# Patient Record
Sex: FEMALE | Race: WHITE | URBAN - NONMETROPOLITAN AREA
[De-identification: names, ages, dates, MRNs, and addresses within clinical notes are randomized per-mention and may not be internally consistent; named-entity substitution may affect disease eponyms.]

---

## 2020-12-03 ENCOUNTER — OFFICE VISIT (OUTPATIENT)
Dept: PRIMARY CARE CLINIC | Age: 60
End: 2020-12-03
Payer: COMMERCIAL

## 2020-12-03 VITALS
HEART RATE: 76 BPM | HEIGHT: 64 IN | TEMPERATURE: 97.6 F | WEIGHT: 194 LBS | BODY MASS INDEX: 33.12 KG/M2 | RESPIRATION RATE: 14 BRPM | DIASTOLIC BLOOD PRESSURE: 70 MMHG | SYSTOLIC BLOOD PRESSURE: 128 MMHG | OXYGEN SATURATION: 97 %

## 2020-12-03 LAB
ALBUMIN SERPL-MCNC: 4.5 G/DL (ref 3.5–5.2)
ALP BLD-CCNC: 141 U/L (ref 35–104)
ALT SERPL-CCNC: 13 U/L (ref 5–33)
ANION GAP SERPL CALCULATED.3IONS-SCNC: 11 MMOL/L (ref 7–19)
AST SERPL-CCNC: 18 U/L (ref 5–32)
BASOPHILS ABSOLUTE: 0.1 K/UL (ref 0–0.2)
BASOPHILS RELATIVE PERCENT: 1 % (ref 0–1)
BILIRUB SERPL-MCNC: 0.3 MG/DL (ref 0.2–1.2)
BUN BLDV-MCNC: 11 MG/DL (ref 6–20)
CALCIUM SERPL-MCNC: 9.7 MG/DL (ref 8.6–10)
CHLORIDE BLD-SCNC: 101 MMOL/L (ref 98–111)
CO2: 28 MMOL/L (ref 22–29)
CREAT SERPL-MCNC: 0.6 MG/DL (ref 0.5–0.9)
EOSINOPHILS ABSOLUTE: 0.2 K/UL (ref 0–0.6)
EOSINOPHILS RELATIVE PERCENT: 2.2 % (ref 0–5)
GFR AFRICAN AMERICAN: >59
GFR NON-AFRICAN AMERICAN: >60
GLUCOSE BLD-MCNC: 91 MG/DL (ref 74–109)
HCT VFR BLD CALC: 43.5 % (ref 37–47)
HEMOGLOBIN: 13.6 G/DL (ref 12–16)
IMMATURE GRANULOCYTES #: 0 K/UL
LYMPHOCYTES ABSOLUTE: 2 K/UL (ref 1.1–4.5)
LYMPHOCYTES RELATIVE PERCENT: 28.7 % (ref 20–40)
MCH RBC QN AUTO: 27.6 PG (ref 27–31)
MCHC RBC AUTO-ENTMCNC: 31.3 G/DL (ref 33–37)
MCV RBC AUTO: 88.2 FL (ref 81–99)
MONOCYTES ABSOLUTE: 0.5 K/UL (ref 0–0.9)
MONOCYTES RELATIVE PERCENT: 6.5 % (ref 0–10)
NEUTROPHILS ABSOLUTE: 4.3 K/UL (ref 1.5–7.5)
NEUTROPHILS RELATIVE PERCENT: 61.3 % (ref 50–65)
PDW BLD-RTO: 13.2 % (ref 11.5–14.5)
PLATELET # BLD: 264 K/UL (ref 130–400)
PMV BLD AUTO: 9.6 FL (ref 9.4–12.3)
POTASSIUM SERPL-SCNC: 4 MMOL/L (ref 3.5–5)
RBC # BLD: 4.93 M/UL (ref 4.2–5.4)
SODIUM BLD-SCNC: 140 MMOL/L (ref 136–145)
TOTAL PROTEIN: 7.4 G/DL (ref 6.6–8.7)
WBC # BLD: 6.9 K/UL (ref 4.8–10.8)

## 2020-12-03 PROCEDURE — 99203 OFFICE O/P NEW LOW 30 MIN: CPT | Performed by: NURSE PRACTITIONER

## 2020-12-03 RX ORDER — HYDROCHLOROTHIAZIDE 12.5 MG/1
12.5 CAPSULE, GELATIN COATED ORAL DAILY
COMMUNITY

## 2020-12-03 RX ORDER — ANTIARTHRITIC COMBINATION NO.2 900 MG
3 TABLET ORAL DAILY
COMMUNITY

## 2020-12-03 RX ORDER — ANASTROZOLE 1 MG/1
1 TABLET ORAL DAILY
COMMUNITY

## 2020-12-03 RX ORDER — M-VIT,TX,IRON,MINS/CALC/FOLIC 27MG-0.4MG
1 TABLET ORAL DAILY
COMMUNITY

## 2020-12-03 RX ORDER — CITALOPRAM 20 MG/1
20 TABLET ORAL DAILY
COMMUNITY

## 2020-12-03 RX ORDER — CYCLOBENZAPRINE HCL 5 MG
TABLET ORAL
COMMUNITY

## 2020-12-03 RX ORDER — POTASSIUM CHLORIDE 20 MEQ/1
20 TABLET, EXTENDED RELEASE ORAL DAILY
COMMUNITY

## 2020-12-03 RX ORDER — PSEUDOEPHEDRINE HYDROCHLORIDE 30 MG/1
30 TABLET ORAL EVERY 6 HOURS PRN
Qty: 30 TABLET | Refills: 1 | Status: SHIPPED | OUTPATIENT
Start: 2020-12-03 | End: 2020-12-17

## 2020-12-03 RX ORDER — ECHINACEA PURPUREA EXTRACT 125 MG
1 TABLET ORAL PRN
COMMUNITY

## 2020-12-03 RX ORDER — AMILORIDE HCL 5 MG
10 TABLET ORAL EVERY 4 HOURS PRN
COMMUNITY

## 2020-12-03 RX ORDER — FLUTICASONE PROPIONATE 50 MCG
2 SPRAY, SUSPENSION (ML) NASAL DAILY
Qty: 1 BOTTLE | Refills: 1 | Status: SHIPPED | OUTPATIENT
Start: 2020-12-03 | End: 2021-01-02

## 2020-12-03 RX ORDER — AMLODIPINE BESYLATE 5 MG/1
5 TABLET ORAL DAILY
COMMUNITY

## 2020-12-03 SDOH — HEALTH STABILITY: MENTAL HEALTH: HOW OFTEN DO YOU HAVE A DRINK CONTAINING ALCOHOL?: NEVER

## 2020-12-03 ASSESSMENT — ENCOUNTER SYMPTOMS
EYE PAIN: 0
SINUS PRESSURE: 1
RHINORRHEA: 0
DIARRHEA: 0
VOMITING: 0
EYE REDNESS: 0
SORE THROAT: 0
COUGH: 0

## 2020-12-03 NOTE — PROGRESS NOTES
[address] [place] [address] [facility] [address] [address]  Dept: [phone]  Dept Fax: [phone]  Loc: [phone]    Pedro Thomason is a 62 y.o. female who presents today for her medical conditions/complaintsas noted below. Pedro Thomason is c/o of Otalgia (both ears are popping, started x1 week. )      HPI:   Patient notes bilateral popping of ears. Notes similar many times in the past and personal history of sinus /allergy issues. She notes it used to be controlled with sudafed, however, notes since being diagnosed with dry eye she has not taken this medication very often. She has not taken it this complaint, notes ears bothering her for around one week. Patient also notes she has recently moved to the area full time and is in the process of establshing PCP with Dr. Rigo Bertrand. Otalgia    There is pain in both ears. This is a recurrent problem. The current episode started in the past 7 days. The problem occurs constantly. The problem has been gradually worsening. There has been no fever. The patient is experiencing no pain. Pertinent negatives include no coughing, diarrhea, ear discharge, rhinorrhea, sore throat or vomiting. She has tried nothing for the symptoms. Her past medical history is significant for a chronic ear infection and hearing loss. There is no history of a tympanostomy tube. notes this history around 26years old       Past Medical History:   Diagnosis Date    Cancer [facility]) 2019    breast cancer, stage one on left side     Hypertension      History reviewed. No pertinent surgical history.   Family History   Problem Relation Age of Onset    Diabetes Mother     High Blood Pressure Mother     High Cholesterol Mother     Stroke Mother     Cancer Father     Diabetes Brother     High Blood Pressure Brother     High Cholesterol Brother      Social History     Tobacco Use    Smoking status: Never Smoker    Smokeless tobacco: Never Used   Substance Use Topics    Alcohol use: Never     Frequency: Never      Current Outpatient Medications on File Prior to Visit   Medication Sig Dispense Refill    amLODIPine (NORVASC) 5 MG tablet Take 5 mg by mouth daily      hydroCHLOROthiazide (MICROZIDE) 12.5 MG capsule Take 12.5 mg by mouth daily      potassium chloride (KLOR-CON M) 20 MEQ extended release tablet Take 20 mEq by mouth daily      anastrozole (ARIMIDEX) 1 MG tablet Take 1 mg by mouth daily      citalopram (CELEXA) 20 MG tablet Take 20 mg by mouth daily      Multiple Vitamins-Minerals (THERAPEUTIC MULTIVITAMIN-MINERALS) tablet Take 1 tablet by mouth daily      Phenylephrine HCl 10 MG TABS Take 10 mg by mouth every 4 hours as needed      psyllium (METAMUCIL SMOOTH TEXTURE) 28 % packet Take by mouth 2 times daily      cyclobenzaprine (FLEXERIL) 5 MG tablet cyclobenzaprine 5 mg tablet   Take 1-2 tab PO BID AS NEEDED      Biotin 5000 MCG TABS Take 3 capsules by mouth daily      sodium chloride (ALTAMIST SPRAY) 0.65 % nasal spray 1 spray by Nasal route as needed       No current facility-administered medications on file prior to visit.        Allergies   Allergen Reactions    Ace Inhibitors Swelling and Other (See Comments)     lisinopril      Omeprazole Other (See Comments)    Lisinopril Swelling    Sulfa Antibiotics Swelling     Health Maintenance   Topic Date Due    Potassium monitoring  1960    Creatinine monitoring  1960    Hepatitis C screen  1960    HIV screen  01/12/1976    DTaP/Tdap/Td vaccine (1 - Tdap) 01/12/1980    Cervical cancer screen  01/12/1982    Lipid screen  01/12/2001    Diabetes screen  01/12/2001    Breast cancer screen  01/12/2011    Shingles Vaccine (1 of 2) 01/12/2011    Colon cancer screen colonoscopy  01/12/2011    Flu vaccine (1) 09/01/2020    Hepatitis A vaccine  Aged Out    Hepatitis B vaccine  Aged Out    Hib vaccine  Aged Out    Meningococcal (ACWY) vaccine  Aged Out    Pneumococcal 0-64 years Vaccine  Aged Out       Subjective:   Review of Systems   HENT: Positive for ear pain and sinus pressure. Negative for ear discharge, rhinorrhea and sore throat. Eyes: Negative for pain and redness. Respiratory: Negative for cough. Gastrointestinal: Negative for diarrhea and vomiting. Objective:   /70 (Site: Right Upper Arm, Position: Sitting)   Pulse 76   Temp 97.6 °F (36.4 °C) (Temporal)   Resp 14   Ht 5' 4\" (1.626 m)   Wt 194 lb (88 kg)   SpO2 97%   BMI 33.30 kg/m²    Physical Exam  Vitals signs and nursing note reviewed. Constitutional:       General: She is not in acute distress. Appearance: Normal appearance. She is not ill-appearing. HENT:      Head: Normocephalic. Right Ear: External ear normal. A middle ear effusion is present. There is no impacted cerumen. Tympanic membrane is not injected or erythematous. Left Ear: External ear normal. A middle ear effusion is present. There is no impacted cerumen. Tympanic membrane is erythematous. Tympanic membrane is not injected. Nose: No congestion. Mouth/Throat:      Mouth: Mucous membranes are moist.      Pharynx: Oropharynx is clear. No oropharyngeal exudate or posterior oropharyngeal erythema. Eyes:      Pupils: Pupils are equal, round, and reactive to light. Cardiovascular:      Rate and Rhythm: Normal rate and regular rhythm. Pulmonary:      Effort: Pulmonary effort is normal.      Breath sounds: Normal breath sounds. Skin:     General: Skin is warm and dry. Neurological:      Mental Status: She is alert and oriented to person, place, and time. No results found for this visit on 12/03/20. Assessment:      Diagnosis Orders   1. Acute LUPE (middle ear effusion), bilateral  fluticasone (FLONASE) 50 MCG/ACT nasal spray   2. Seasonal allergic rhinitis, unspecified trigger  fluticasone (FLONASE) 50 MCG/ACT nasal spray       Plan:   Jameson Perkins was seen today for otalgia.     Diagnoses and all orders for this visit:    Acute LUPE (middle ear effusion), bilateral  -     fluticasone (FLONASE) 50 MCG/ACT nasal spray; 2 sprays by Each Nostril route daily    Seasonal allergic rhinitis, unspecified trigger  -     fluticasone (FLONASE) 50 MCG/ACT nasal spray; 2 sprays by Each Nostril route daily    Other orders  -     pseudoephedrine (DECONGESTANT) 30 MG tablet; Take 1 tablet by mouth every 6 hours as needed for Congestion       Return if symptoms worsen or fail to improve. New medication added today and was advised on the risks and benefits of the medication. Pt was instructed on the proper use/technique of the medication. Pt agrees to try the new medication as prescribed and feels able to comply with treatment plan today. No barriers for treatment plan found today. Pt will let us know if any difficulty obtaining new medication or with any other concerns. Sudafed and nasal spray for middle ear effusion and allergic rhinitis. Patient is in process of establishing primary care with Dr Matti Johnson. Patient given educational materials- see patient instructions. Discussed use, benefit, and side effects of prescribedmedications. All patient questions answered. Pt voiced understanding.      Electronically signed by NELI Scott CNP on 12/3/2020 at 1:47 PM

## 2021-01-28 ENCOUNTER — OFFICE VISIT (OUTPATIENT)
Dept: PRIMARY CARE CLINIC | Age: 61
End: 2021-01-28
Payer: COMMERCIAL

## 2021-01-28 VITALS
BODY MASS INDEX: 34.35 KG/M2 | HEIGHT: 64 IN | WEIGHT: 201.2 LBS | OXYGEN SATURATION: 97 % | RESPIRATION RATE: 20 BRPM | HEART RATE: 72 BPM | SYSTOLIC BLOOD PRESSURE: 116 MMHG | TEMPERATURE: 98.6 F | DIASTOLIC BLOOD PRESSURE: 72 MMHG

## 2021-01-28 DIAGNOSIS — Z11.59 SPECIAL SCREENING EXAMINATION FOR VIRAL DISEASE: ICD-10-CM

## 2021-01-28 DIAGNOSIS — J06.9 VIRAL UPPER RESPIRATORY TRACT INFECTION: ICD-10-CM

## 2021-01-28 DIAGNOSIS — J02.9 SORE THROAT: Primary | ICD-10-CM

## 2021-01-28 LAB
INFLUENZA A ANTIBODY: NEGATIVE
INFLUENZA B ANTIBODY: NEGATIVE
S PYO AG THROAT QL: NORMAL

## 2021-01-28 PROCEDURE — 99213 OFFICE O/P EST LOW 20 MIN: CPT | Performed by: NURSE PRACTITIONER

## 2021-01-28 PROCEDURE — 87880 STREP A ASSAY W/OPTIC: CPT | Performed by: NURSE PRACTITIONER

## 2021-01-28 PROCEDURE — 87804 INFLUENZA ASSAY W/OPTIC: CPT | Performed by: NURSE PRACTITIONER

## 2021-01-28 ASSESSMENT — ENCOUNTER SYMPTOMS
RHINORRHEA: 1
EYE WATERING: 0
SINUS PRESSURE: 0
ABDOMINAL PAIN: 0
PHOTOPHOBIA: 0
EYE PAIN: 0
SORE THROAT: 1
VOMITING: 0
SHORTNESS OF BREATH: 0
FACIAL SWEATING: 0
SCALP TENDERNESS: 0
NAUSEA: 0
BLURRED VISION: 0
SINUS PAIN: 0
COUGH: 0
SWOLLEN GLANDS: 0
CHEST TIGHTNESS: 0
EYE REDNESS: 0
BACK PAIN: 0

## 2021-01-28 NOTE — PROGRESS NOTES
[address] [facility] [address] [address]  Dept: [phone]  Dept Fax: [phone]  Loc: [phone]    Giulia Chase is a 62 y.o. female who presents today for her medical conditions/complaintsas noted below. Giulia Chase is c/o of Headache (x 2 days), Generalized Body Aches (x 2 days), Nausea (x 2 days), and Pharyngitis (x 2 days)      HPI:   Starting Monday with sore throat pounding headache and just feel achy. Headache   This is a new problem. The current episode started in the past 7 days. The problem occurs constantly. The pain is located in the frontal region. The pain quality is not similar to prior headaches. The quality of the pain is described as aching. The pain is at a severity of 1/10. Associated symptoms include ear pain, muscle aches, rhinorrhea and a sore throat. Pertinent negatives include no abdominal pain, abnormal behavior, anorexia, back pain, blurred vision, coughing, dizziness, drainage, eye pain, eye redness, eye watering, facial sweating, fever, hearing loss, insomnia, loss of balance, nausea, neck pain, numbness, phonophobia, photophobia, scalp tenderness, seizures, sinus pressure, swollen glands or vomiting. Nothing aggravates the symptoms. She has tried acetaminophen for the symptoms. The treatment provided no relief. Generalized Body Aches  This is a new problem. The current episode started in the past 7 days. The problem occurs constantly. The problem has been unchanged. Associated symptoms include fatigue, headaches, myalgias and a sore throat. Pertinent negatives include no abdominal pain, anorexia, chest pain, chills, congestion, coughing, fever, nausea, neck pain, numbness, rash, swollen glands or vomiting. Nothing aggravates the symptoms. She has tried nothing for the symptoms.    Pharyngitis This is a new problem. The current episode started in the past 7 days. The problem has been unchanged. Associated symptoms include fatigue, headaches, myalgias and a sore throat. Pertinent negatives include no abdominal pain, anorexia, chest pain, chills, congestion, coughing, fever, nausea, neck pain, numbness, rash, swollen glands or vomiting. The symptoms are aggravated by swallowing. She has tried acetaminophen for the symptoms. The treatment provided no relief. Past Medical History:   Diagnosis Date    Cancer [facility]) 2019    breast cancer, stage one on left side     Hypertension      History reviewed. No pertinent surgical history.   Family History   Problem Relation Age of Onset    Diabetes Mother     High Blood Pressure Mother     High Cholesterol Mother     Stroke Mother     Cancer Father     Diabetes Brother     High Blood Pressure Brother     High Cholesterol Brother      Social History     Tobacco Use    Smoking status: Never Smoker    Smokeless tobacco: Never Used   Substance Use Topics    Alcohol use: Never     Frequency: Never      Current Outpatient Medications on File Prior to Visit   Medication Sig Dispense Refill    amLODIPine (NORVASC) 5 MG tablet Take 5 mg by mouth daily      hydroCHLOROthiazide (MICROZIDE) 12.5 MG capsule Take 12.5 mg by mouth daily      potassium chloride (KLOR-CON M) 20 MEQ extended release tablet Take 20 mEq by mouth daily      anastrozole (ARIMIDEX) 1 MG tablet Take 1 mg by mouth daily      citalopram (CELEXA) 20 MG tablet Take 20 mg by mouth daily      Multiple Vitamins-Minerals (THERAPEUTIC MULTIVITAMIN-MINERALS) tablet Take 1 tablet by mouth daily      Phenylephrine HCl 10 MG TABS Take 10 mg by mouth every 4 hours as needed      cyclobenzaprine (FLEXERIL) 5 MG tablet cyclobenzaprine 5 mg tablet   Take 1-2 tab PO BID AS NEEDED      Biotin 5000 MCG TABS Take 3 capsules by mouth daily  sodium chloride (ALTAMIST SPRAY) 0.65 % nasal spray 1 spray by Nasal route as needed      psyllium (METAMUCIL SMOOTH TEXTURE) 28 % packet Take by mouth 2 times daily      fluticasone (FLONASE) 50 MCG/ACT nasal spray 2 sprays by Each Nostril route daily 1 Bottle 1     No current facility-administered medications on file prior to visit. Allergies   Allergen Reactions    Ace Inhibitors Swelling and Other (See Comments)     lisinopril      Omeprazole Other (See Comments)    Lisinopril Swelling    Sulfa Antibiotics Swelling     Health Maintenance   Topic Date Due    Hepatitis C screen  1960    HIV screen  01/12/1976    DTaP/Tdap/Td vaccine (1 - Tdap) 01/12/1980    Cervical cancer screen  01/12/1982    Lipid screen  01/12/2001    Breast cancer screen  01/12/2011    Shingles Vaccine (1 of 2) 01/12/2011    Colon cancer screen colonoscopy  01/12/2011    Flu vaccine (1) 09/01/2020    Potassium monitoring  12/03/2021    Creatinine monitoring  12/03/2021    Hepatitis A vaccine  Aged Out    Hepatitis B vaccine  Aged Out    Hib vaccine  Aged Out    Meningococcal (ACWY) vaccine  Aged Out    Pneumococcal 0-64 years Vaccine  Aged Out       Subjective:   Review of Systems   Constitutional: Positive for fatigue. Negative for chills and fever. HENT: Positive for ear pain, rhinorrhea and sore throat. Negative for congestion, hearing loss, postnasal drip, sinus pressure and sinus pain. Eyes: Negative for blurred vision, photophobia, pain and redness. Respiratory: Negative for cough, chest tightness and shortness of breath. Cardiovascular: Negative for chest pain. Gastrointestinal: Negative for abdominal pain, anorexia, nausea and vomiting. Musculoskeletal: Positive for myalgias. Negative for back pain and neck pain. Skin: Negative for rash. Neurological: Positive for headaches. Negative for dizziness, seizures, numbness and loss of balance. Psychiatric/Behavioral: The patient does not have insomnia. Objective:   /72 (Site: Right Upper Arm, Position: Sitting)   Pulse 72   Temp 98.6 °F (37 °C) (Temporal)   Resp 20   Ht 5' 4\" (1.626 m)   Wt 201 lb 3.2 oz (91.3 kg)   SpO2 97%   BMI 34.54 kg/m²    Physical Exam  Vitals signs and nursing note reviewed. Constitutional:       General: She is not in acute distress. Appearance: Normal appearance. She is not ill-appearing. HENT:      Head: Normocephalic. Right Ear: Tympanic membrane and ear canal normal.      Left Ear: Tympanic membrane and ear canal normal.      Nose: Nose normal.      Mouth/Throat:      Mouth: Mucous membranes are moist.      Comments: Small tonsil stones noted to the right tonsil  Eyes:      Pupils: Pupils are equal, round, and reactive to light. Neck:      Musculoskeletal: Normal range of motion and neck supple. Cardiovascular:      Rate and Rhythm: Normal rate and regular rhythm. Pulses: Normal pulses. Heart sounds: Normal heart sounds. Pulmonary:      Effort: Pulmonary effort is normal.      Breath sounds: Normal breath sounds. Abdominal:      General: Abdomen is flat. Bowel sounds are normal.      Palpations: Abdomen is soft. Skin:     General: Skin is warm and dry. Neurological:      Mental Status: She is alert. No results found for this visit on 01/28/21. Assessment:      Diagnosis Orders   1. Sore throat  POCT rapid strep A    POCT Influenza A/B   2. Viral upper respiratory tract infection     3. Special screening examination for viral disease         Plan:   Andreia Argueta was seen today for headache, generalized body aches, nausea and pharyngitis. Diagnoses and all orders for this visit:    Sore throat  -     POCT rapid strep A  -     POCT Influenza A/B    Viral upper respiratory tract infection    Special screening examination for viral disease     Diatherex sent for COVID    Return if symptoms worsen or fail to improve. Exam consistent with viral illness. Typically viruses pass on their own in 7-10 days. You may take Over the counter medications as directed for cough, congestion, discomfort, or fever. If symptoms worsen or do not improve then call the clinic for further instructions or follow up with your Primary Care provider. You were screened for COVID today and swabbed. You will be called with the results and any indicated treatments. You were provided with written CDC isolation/quarantine guidelines. Patient given educational materials- see patient instructions. Discussed use, benefit, and side effects of prescribedmedications. All patient questions answered. Pt voiced understanding.      Electronically signed by NELI Lazo CNP on 1/28/2021 at 10:26 AM

## 2021-02-01 ENCOUNTER — TELEPHONE (OUTPATIENT)
Dept: PRIMARY CARE CLINIC | Age: 61
End: 2021-02-01

## 2021-02-27 PROBLEM — J02.9 SORE THROAT: Status: RESOLVED | Noted: 2021-01-28 | Resolved: 2021-02-27

## 2021-02-27 PROBLEM — Z11.59 SPECIAL SCREENING EXAMINATION FOR VIRAL DISEASE: Status: RESOLVED | Noted: 2021-01-28 | Resolved: 2021-02-27

## 2021-03-31 DIAGNOSIS — H65.193 ACUTE MEE (MIDDLE EAR EFFUSION), BILATERAL: ICD-10-CM

## 2021-03-31 DIAGNOSIS — J30.2 SEASONAL ALLERGIC RHINITIS, UNSPECIFIED TRIGGER: ICD-10-CM

## 2021-09-10 ENCOUNTER — TRANSCRIBE ORDERS (OUTPATIENT)
Dept: ADMINISTRATIVE | Facility: HOSPITAL | Age: 61
End: 2021-09-10

## 2021-09-10 DIAGNOSIS — Z79.811 USE OF AROMATASE INHIBITORS: Primary | ICD-10-CM

## 2021-09-14 ENCOUNTER — TRANSCRIBE ORDERS (OUTPATIENT)
Dept: ADMINISTRATIVE | Facility: HOSPITAL | Age: 61
End: 2021-09-14

## 2021-09-14 DIAGNOSIS — Z79.811 USE OF AROMATASE INHIBITORS: Primary | ICD-10-CM

## 2021-09-15 ENCOUNTER — HOSPITAL ENCOUNTER (OUTPATIENT)
Dept: BONE DENSITY | Facility: HOSPITAL | Age: 61
Discharge: HOME | End: 2021-09-15
Admitting: RADIOLOGY

## 2021-09-15 DIAGNOSIS — Z79.811 USE OF AROMATASE INHIBITORS: ICD-10-CM

## 2021-09-15 PROCEDURE — 77080 DXA BONE DENSITY AXIAL: CPT

## 2021-11-19 ENCOUNTER — OFFICE VISIT (OUTPATIENT)
Dept: PRIMARY CARE CLINIC | Age: 61
End: 2021-11-19

## 2021-11-19 DIAGNOSIS — Z01.812 ENCOUNTER FOR PREOPERATIVE SCREENING LABORATORY TESTING FOR COVID-19 VIRUS: Primary | ICD-10-CM

## 2021-11-19 DIAGNOSIS — Z11.52 ENCOUNTER FOR SCREENING FOR COVID-19: ICD-10-CM

## 2021-11-19 DIAGNOSIS — Z20.822 ENCOUNTER FOR PREOPERATIVE SCREENING LABORATORY TESTING FOR COVID-19 VIRUS: Primary | ICD-10-CM

## 2021-11-19 LAB — SARS-COV-2, PCR: NOT DETECTED

## 2021-11-19 PROCEDURE — 99999 PR OFFICE/OUTPT VISIT,PROCEDURE ONLY: CPT | Performed by: NURSE PRACTITIONER

## 2022-02-08 ENCOUNTER — OFFICE VISIT (OUTPATIENT)
Dept: INTERNAL MEDICINE | Facility: CLINIC | Age: 62
End: 2022-02-08

## 2022-02-08 VITALS
BODY MASS INDEX: 35.17 KG/M2 | HEART RATE: 82 BPM | SYSTOLIC BLOOD PRESSURE: 138 MMHG | DIASTOLIC BLOOD PRESSURE: 85 MMHG | RESPIRATION RATE: 16 BRPM | HEIGHT: 64 IN | OXYGEN SATURATION: 98 % | WEIGHT: 206 LBS | TEMPERATURE: 98.4 F

## 2022-02-08 DIAGNOSIS — R87.610 ATYPICAL SQUAMOUS CELLS OF UNDETERMINED SIGNIFICANCE ON CYTOLOGIC SMEAR OF CERVIX (ASC-US): ICD-10-CM

## 2022-02-08 DIAGNOSIS — I10 PRIMARY HYPERTENSION: Primary | ICD-10-CM

## 2022-02-08 PROCEDURE — 99203 OFFICE O/P NEW LOW 30 MIN: CPT | Performed by: NURSE PRACTITIONER

## 2022-02-08 RX ORDER — PSYLLIUM HUSK 0.4 G
CAPSULE ORAL
COMMUNITY
End: 2022-02-28

## 2022-02-08 RX ORDER — AMLODIPINE BESYLATE 5 MG/1
5 TABLET ORAL DAILY
Qty: 90 TABLET | Refills: 1 | Status: SHIPPED | OUTPATIENT
Start: 2022-02-08 | End: 2022-08-08

## 2022-02-08 NOTE — PROGRESS NOTES
Subjective     Chief Complaint   Patient presents with   • Hypertension       History of Present Illness  Pt comes in today to establish care. She carries a history of breast cancer in remission status post  with abnormal pap smears which was HPV positive. Pap was performed on 1/20/2022. States she wants to transfer her medical care to the [facility]. Her last physical was by oncology. She has had hypertension for years. Usually controlled on Norvasc. States no significant lower extremity edema.       Review of Systems   Otherwise complete ROS reviewed and negative except as mentioned in the HPI.    Past Medical History:   Past Medical History:   Diagnosis Date   • Arthritis    • Breast cancer (HCC)    • GERD (gastroesophageal reflux disease)    • Hiatal hernia    • Hypertension    • Hypokalemia    • Idiopathic hematuria    • Kidney stones    • Lipoma    • Renal disorder      Past Surgical History:  Past Surgical History:   Procedure Laterality Date   • BREAST LUMPECTOMY     • DILATION AND CURETTAGE, DIAGNOSTIC / THERAPEUTIC     • LIPOMA RESECTION     • OTHER SURGICAL HISTORY      rectocele     Social History:  reports that she has never smoked. She has never used smokeless tobacco. She reports current alcohol use. She reports previous drug use.    Family History: family history includes Diabetes in her brother and mother; High cholesterol in her brother; Hypertension in her brother and mother; Leukemia in her father; Lymphoma in her father; Stroke in her mother.      Allergies:  Allergies   Allergen Reactions   • Ace Inhibitors Other (See Comments), Swelling and Unknown - High Severity     lisinopril  lisinopril     • Omeprazole Other (See Comments) and Unknown - High Severity   • Sulfa Antibiotics Swelling   • Levofloxacin Myalgia and Other (See Comments)     Muscle pain       Medications:  Prior to Admission medications    Medication Sig Start Date End Date Taking? Authorizing Provider  "  amLODIPine (NORVASC) 5 MG tablet Take 5 mg by mouth.   Yes Albert Arzate MD   anastrozole (ARIMIDEX) 1 MG tablet Take 1 mg by mouth.   Yes Albert Arzate MD   Biotin 5 MG tablet Take 3 capsules by mouth.   Yes Albert Arzate MD   hydroCHLOROthiazide (MICROZIDE) 12.5 MG capsule Take 12.5 mg by mouth.   Yes Albert Arzate MD   Multiple Vitamins-Calcium (ONE-A-DAY WOMENS FORMULA PO) One-A-Day Womens Formula   Yes Albert Arzate MD   phenylephrine (SUDAFED PE) 10 MG tablet Take 10 mg by mouth.   Yes Albert Arzate MD   potassium chloride (K-DUR,KLOR-CON) 20 MEQ CR tablet Take 20 mEq by mouth Daily.   Yes Albert Arzate MD   Probiotic Product (PROBIOTIC-10 PO) Probiotic   Yes Albert Arzate MD   psyllium (METAMUCIL SMOOTH TEXTURE) 28 % packet Take  by mouth.   Yes Albert Arzate MD   citalopram (CeleXA) 20 MG tablet Take 20 mg by mouth.    Albert Arzaet MD   cyclobenzaprine (FLEXERIL) 5 MG tablet cyclobenzaprine 5 mg tablet   Take 1-2 tab PO BID AS NEEDED    Albert Arzate MD   lidocaine-prilocaine (EMLA) 2.5-2.5 % cream Apply 1 application topically to the appropriate area as directed Every 2 (Two) Hours As Needed for Mild Pain .    Albert Arzate MD   multivitamin with minerals (therapeutic multivitamin-minerals) tablet tablet Take 1 tablet by mouth.    Albert Arzate MD   Psyllium (Metamucil) 0.36 g capsule Metamucil    Albert Arzate MD   sodium chloride 0.65 % nasal spray 1 spray into the nostril(s) as directed by provider.    Albert Arzate MD       Objective     Vital Signs: /85 (BP Location: Right arm, Patient Position: Sitting, Cuff Size: Large Adult)   Pulse 82   Temp 98.4 °F (36.9 °C) (Infrared)   Resp 16   Ht 162.6 cm (64\")   Wt 93.4 kg (206 lb)   SpO2 98%   BMI 35.36 kg/m²   Physical Exam  Vitals reviewed.   Constitutional:       Appearance: She is well-developed.   HENT:      Head: " Normocephalic and atraumatic.   Eyes:      Pupils: Pupils are equal, round, and reactive to light.   Neck:      Vascular: No JVD.   Cardiovascular:      Rate and Rhythm: Normal rate and regular rhythm.   Pulmonary:      Effort: Pulmonary effort is normal.   Abdominal:      General: Bowel sounds are normal.      Palpations: Abdomen is soft.   Musculoskeletal:         General: No deformity.      Cervical back: Normal range of motion and neck supple.   Lymphadenopathy:      Cervical: No cervical adenopathy.   Skin:     General: Skin is warm and dry.   Neurological:      Mental Status: She is alert and oriented to person, place, and time.   Psychiatric:         Behavior: Behavior normal.         Thought Content: Thought content normal.         Judgment: Judgment normal.       Results Reviewed:  Glucose   Date Value Ref Range Status   12/03/2020 91 74 - 109 mg/dL Final     BUN   Date Value Ref Range Status   12/03/2020 11 6 - 20 mg/dL Final     Creatinine   Date Value Ref Range Status   12/03/2020 0.6 0.5 - 0.9 mg/dL Final     Sodium   Date Value Ref Range Status   12/03/2020 140 136 - 145 mmol/L Final     Potassium   Date Value Ref Range Status   12/03/2020 4.0 3.5 - 5.0 mmol/L Final     Chloride   Date Value Ref Range Status   12/03/2020 101 98 - 111 mmol/L Final     CO2   Date Value Ref Range Status   12/03/2020 28 22 - 29 mmol/L Final     Calcium   Date Value Ref Range Status   12/03/2020 9.7 8.6 - 10.0 mg/dL Final     ALT (SGPT)   Date Value Ref Range Status   12/03/2020 13 5 - 33 U/L Final     AST (SGOT)   Date Value Ref Range Status   12/03/2020 18 5 - 32 U/L Final     WBC   Date Value Ref Range Status   12/03/2020 6.9 4.8 - 10.8 K/uL Final     Hematocrit   Date Value Ref Range Status   12/03/2020 43.5 37.0 - 47.0 % Final     Platelets   Date Value Ref Range Status   12/03/2020 264 130 - 400 K/uL Final   06/12/2019 303 150 - 450 10*3/uL Final         Assessment / Plan     Assessment/Plan:  Diagnoses and all orders  for this visit:    1. Primary hypertension (Primary)  -     amLODIPine (NORVASC) 5 MG tablet; Take 1 tablet by mouth Daily.  Dispense: 90 tablet; Refill: 1    2. Atypical squamous cells of undetermined significance on cytologic smear of cervix (ASC-US)  -     Ambulatory Referral to Obstetrics / Gynecology      Return in about 1 month (around 3/8/2022) for Annual physical. unless patient needs to be seen sooner or acute issues arise.    Code Status: Full.     I have discussed the patient results/orders and and plan/recommendation with them at today's visit.      Thelma Foster, APRN   02/08/2022

## 2022-02-11 ENCOUNTER — PATIENT ROUNDING (BHMG ONLY) (OUTPATIENT)
Dept: INTERNAL MEDICINE | Facility: CLINIC | Age: 62
End: 2022-02-11

## 2022-02-11 NOTE — PROGRESS NOTES
"February 11, 2022    Hello, may I speak with Telma Herrera?    My name is Chelsey     I am  with [facility] [place]  [facility]  [address] [place] [address] [phone].    Before we get started may I verify your date of birth? 1960    I am calling to officially welcome you to our practice and ask about your recent visit. Is this a good time to talk? yes    Tell me about your visit with us. What things went well?  Patient stated that she is CRAZY about Shanelle. She said, \"she is my kind of people.\" She stated she was very please with her first appt. Shanelle got her an appt with someone that she's been needing to see for awhile. She said from the time she walked in until the time she walked out she was very happy.        We're always looking for ways to make our patients' experiences even better. Do you have recommendations on ways we may improve?  no    Overall were you satisfied with your first visit to our practice? yes       I appreciate you taking the time to speak with me today. Is there anything else I can do for you? no      Thank you, and have a great day.      "

## 2022-02-28 ENCOUNTER — PROCEDURE VISIT (OUTPATIENT)
Dept: OBSTETRICS AND GYNECOLOGY | Facility: CLINIC | Age: 62
End: 2022-02-28

## 2022-02-28 VITALS
HEIGHT: 64 IN | DIASTOLIC BLOOD PRESSURE: 92 MMHG | WEIGHT: 201 LBS | BODY MASS INDEX: 34.31 KG/M2 | SYSTOLIC BLOOD PRESSURE: 144 MMHG

## 2022-02-28 DIAGNOSIS — R87.810 ASCUS WITH POSITIVE HIGH RISK HPV CERVICAL: Primary | ICD-10-CM

## 2022-02-28 DIAGNOSIS — R87.610 ASCUS WITH POSITIVE HIGH RISK HPV CERVICAL: Primary | ICD-10-CM

## 2022-02-28 PROCEDURE — 57454 BX/CURETT OF CERVIX W/SCOPE: CPT | Performed by: OBSTETRICS & GYNECOLOGY

## 2022-02-28 PROCEDURE — 88305 TISSUE EXAM BY PATHOLOGIST: CPT | Performed by: OBSTETRICS & GYNECOLOGY

## 2022-02-28 NOTE — PROGRESS NOTES
"Telma Herrera is a 62 y.o. female  here today for colposcopy.  Her most recent Pap smear was read as ASCUS, HPV positive.  She has had colposcopy in the past.    /92 (BP Location: Right arm, Patient Position: Sitting)   Ht 162.6 cm (64\")   Wt 91.2 kg (201 lb)   BMI 34.50 kg/m²      Colposcopy was performed in the office today.  She was placed in the lithotomy position on the exam table.  A speculum was inserted and the cervix well visualized.  The cervix appears atrophic, and was cleansed with acetic acid swabs.  Inspection with the colposcope showed the transformation zone to be endocervical.  It was not seen.  There was a tiny acetowhite lesion noted at 3:00 without vascular changes.  Colposcopy was unsatisfactory. The cervix was anesthetized with Hurricaine spray.  A 3:00 biopsy was performed.  The biopsy site was made hemostatic with a silver nitrate stick.  An endocervical curettage was also performed.  She tolerated the procedure well.    Colposcopic impression: Mild dysplasia, atrophy    We will notify her when the pathology report is available to discuss further care.  We have discussed post colposcopy instructions.   "

## 2022-03-06 LAB
CYTO UR: NORMAL
LAB AP CASE REPORT: NORMAL
LAB AP CLINICAL INFORMATION: NORMAL
PATH REPORT.FINAL DX SPEC: NORMAL
PATH REPORT.GROSS SPEC: NORMAL

## 2022-03-07 ENCOUNTER — TELEPHONE (OUTPATIENT)
Dept: OBSTETRICS AND GYNECOLOGY | Facility: CLINIC | Age: 62
End: 2022-03-07

## 2022-03-07 NOTE — TELEPHONE ENCOUNTER
Pt called office to ask for cervical biopsy results from tissue biopsy on 02/28/22.   Please advise

## 2022-03-07 NOTE — TELEPHONE ENCOUNTER
Called and updated pt and she voiced understanding.  Pt transferred to scheduling to schedule appt for next annual.

## 2022-03-07 NOTE — TELEPHONE ENCOUNTER
Please notify patient that her cervical biopsy results are benign.  They indicate atrophy as expected.  No treatment needed at this time.  Repeat Pap smear in 1 year.

## 2022-03-10 ENCOUNTER — CLINICAL SUPPORT (OUTPATIENT)
Dept: INTERNAL MEDICINE | Facility: CLINIC | Age: 62
End: 2022-03-10

## 2022-03-10 DIAGNOSIS — Z00.00 ROUTINE GENERAL MEDICAL EXAMINATION AT A HEALTH CARE FACILITY: Primary | ICD-10-CM

## 2022-03-10 PROCEDURE — 36415 COLL VENOUS BLD VENIPUNCTURE: CPT | Performed by: NURSE PRACTITIONER

## 2022-03-10 NOTE — PROGRESS NOTES
Venipuncture Blood Specimen Collection  Venipuncture performed in right ac by Niya Adamson MA with good hemostasis. Patient tolerated the procedure well without complications.   03/10/22   Niya Adamson MA

## 2022-03-11 LAB
ALBUMIN SERPL-MCNC: 4.7 G/DL (ref 3.8–4.8)
ALBUMIN/GLOB SERPL: 2 {RATIO} (ref 1.2–2.2)
ALP SERPL-CCNC: 147 IU/L (ref 44–121)
ALT SERPL-CCNC: 22 IU/L (ref 0–32)
AST SERPL-CCNC: 22 IU/L (ref 0–40)
BASOPHILS # BLD AUTO: 0 X10E3/UL (ref 0–0.2)
BASOPHILS NFR BLD AUTO: 1 %
BILIRUB SERPL-MCNC: 0.5 MG/DL (ref 0–1.2)
BUN SERPL-MCNC: 11 MG/DL (ref 8–27)
BUN/CREAT SERPL: 12 (ref 12–28)
CALCIUM SERPL-MCNC: 9.8 MG/DL (ref 8.7–10.3)
CHLORIDE SERPL-SCNC: 101 MMOL/L (ref 96–106)
CHOLEST SERPL-MCNC: 198 MG/DL (ref 100–199)
CO2 SERPL-SCNC: 24 MMOL/L (ref 20–29)
CREAT SERPL-MCNC: 0.91 MG/DL (ref 0.57–1)
EGFR GENE MUT ANL BLD/T: 72 ML/MIN/1.73
EOSINOPHIL # BLD AUTO: 0.1 X10E3/UL (ref 0–0.4)
EOSINOPHIL NFR BLD AUTO: 2 %
ERYTHROCYTE [DISTWIDTH] IN BLOOD BY AUTOMATED COUNT: 12.9 % (ref 11.7–15.4)
GLOBULIN SER CALC-MCNC: 2.4 G/DL (ref 1.5–4.5)
GLUCOSE SERPL-MCNC: 101 MG/DL (ref 65–99)
HCT VFR BLD AUTO: 44.7 % (ref 34–46.6)
HDLC SERPL-MCNC: 49 MG/DL
HGB BLD-MCNC: 14.5 G/DL (ref 11.1–15.9)
IMM GRANULOCYTES # BLD AUTO: 0 X10E3/UL (ref 0–0.1)
IMM GRANULOCYTES NFR BLD AUTO: 0 %
LDLC SERPL CALC-MCNC: 124 MG/DL (ref 0–99)
LYMPHOCYTES # BLD AUTO: 1.4 X10E3/UL (ref 0.7–3.1)
LYMPHOCYTES NFR BLD AUTO: 29 %
MCH RBC QN AUTO: 27.2 PG (ref 26.6–33)
MCHC RBC AUTO-ENTMCNC: 32.4 G/DL (ref 31.5–35.7)
MCV RBC AUTO: 84 FL (ref 79–97)
MONOCYTES # BLD AUTO: 0.4 X10E3/UL (ref 0.1–0.9)
MONOCYTES NFR BLD AUTO: 8 %
NEUTROPHILS # BLD AUTO: 2.8 X10E3/UL (ref 1.4–7)
NEUTROPHILS NFR BLD AUTO: 60 %
PLATELET # BLD AUTO: 262 X10E3/UL (ref 150–450)
POTASSIUM SERPL-SCNC: 4.2 MMOL/L (ref 3.5–5.2)
PROT SERPL-MCNC: 7.1 G/DL (ref 6–8.5)
RBC # BLD AUTO: 5.34 X10E6/UL (ref 3.77–5.28)
SODIUM SERPL-SCNC: 142 MMOL/L (ref 134–144)
TRIGL SERPL-MCNC: 143 MG/DL (ref 0–149)
VLDLC SERPL CALC-MCNC: 25 MG/DL (ref 5–40)
WBC # BLD AUTO: 4.8 X10E3/UL (ref 3.4–10.8)

## 2022-03-15 ENCOUNTER — OFFICE VISIT (OUTPATIENT)
Dept: INTERNAL MEDICINE | Facility: CLINIC | Age: 62
End: 2022-03-15

## 2022-03-15 VITALS
DIASTOLIC BLOOD PRESSURE: 77 MMHG | TEMPERATURE: 98 F | HEIGHT: 64 IN | OXYGEN SATURATION: 97 % | SYSTOLIC BLOOD PRESSURE: 115 MMHG | RESPIRATION RATE: 16 BRPM | HEART RATE: 71 BPM | BODY MASS INDEX: 34.31 KG/M2 | WEIGHT: 201 LBS

## 2022-03-15 DIAGNOSIS — Z12.11 ENCOUNTER FOR SCREENING FOR MALIGNANT NEOPLASM OF COLON: ICD-10-CM

## 2022-03-15 DIAGNOSIS — Z00.00 ANNUAL PHYSICAL EXAM: Primary | ICD-10-CM

## 2022-03-15 PROBLEM — Z01.419 NORMAL GYNECOLOGIC EXAMINATION: Status: ACTIVE | Noted: 2022-01-20

## 2022-03-15 PROBLEM — I10 HYPERTENSIVE DISORDER: Status: ACTIVE | Noted: 2019-07-24

## 2022-03-15 PROBLEM — M54.50 LOW BACK PAIN: Status: ACTIVE | Noted: 2020-07-15

## 2022-03-15 PROBLEM — C50.912 INFILTRATING DUCTAL CARCINOMA OF LEFT BREAST: Status: ACTIVE | Noted: 2019-03-08

## 2022-03-15 PROBLEM — K21.9 GASTROESOPHAGEAL REFLUX DISEASE: Status: ACTIVE | Noted: 2020-07-15

## 2022-03-15 PROBLEM — Z98.890 HISTORY OF REPAIR OF RECTOCELE: Status: ACTIVE | Noted: 2022-01-19

## 2022-03-15 PROBLEM — Z17.0 MALIGNANT NEOPLASM OF LOWER-OUTER QUADRANT OF LEFT BREAST OF FEMALE, ESTROGEN RECEPTOR POSITIVE: Status: ACTIVE | Noted: 2019-01-17

## 2022-03-15 PROBLEM — C50.512 MALIGNANT NEOPLASM OF LOWER-OUTER QUADRANT OF LEFT BREAST OF FEMALE, ESTROGEN RECEPTOR POSITIVE (HCC): Status: ACTIVE | Noted: 2019-01-17

## 2022-03-15 PROBLEM — D17.20 LIPOMA OF AXILLA: Status: ACTIVE | Noted: 2022-01-19

## 2022-03-15 PROBLEM — Z85.3 HISTORY OF MALIGNANT NEOPLASM OF BREAST: Status: ACTIVE | Noted: 2022-01-20

## 2022-03-15 PROCEDURE — 99396 PREV VISIT EST AGE 40-64: CPT | Performed by: NURSE PRACTITIONER

## 2022-03-15 NOTE — PROGRESS NOTES
Subjective     Chief Complaint   Patient presents with   • Annual Exam       History of Present Illness    Patient presents today for a annual exam.   Patient reports high blood pressure at home running about 140/94 at night. Patient reports dizziness but attributes it to vertigo.   Patient would like to discuss diet options. She has been reading about Mediterranean diet due to the antiinflammatory benefits. She does have concerns for diet compliance. Options discussed how to incorporate healthier options into daily diet.     Patient's PMR from outside medical facility reviewed and noted.    Review of Systems   Constitutional: Positive for fatigue.   HENT: Positive for rhinorrhea.    Eyes: Positive for visual disturbance.   Respiratory: Negative for chest tightness and shortness of breath.    Cardiovascular: Negative for chest pain and leg swelling.   Gastrointestinal: Negative for abdominal pain, blood in stool, constipation and diarrhea.   Endocrine: Negative for polyuria.   Genitourinary: Negative for difficulty urinating.   Neurological: Positive for dizziness.   Psychiatric/Behavioral: Negative for sleep disturbance.      Otherwise complete ROS reviewed.    Past Medical History:   Past Medical History:   Diagnosis Date   • Arthritis    • Breast cancer (HCC)    • GERD (gastroesophageal reflux disease)    • Hiatal hernia    • Hypertension    • Hypokalemia    • Idiopathic hematuria    • Kidney stones    • Lipoma    • Osteopenia    • Renal disorder      Past Surgical History:  Past Surgical History:   Procedure Laterality Date   • BREAST LUMPECTOMY     • DILATION AND CURETTAGE, DIAGNOSTIC / THERAPEUTIC     • LIPOMA RESECTION     • OTHER SURGICAL HISTORY      rectocele   • WISDOM TOOTH EXTRACTION       Social History:  reports that she has never smoked. She has never used smokeless tobacco. She reports current alcohol use. She reports previous drug use.    Family History: family history includes Diabetes in  "her brother and mother; High cholesterol in her brother; Hypertension in her brother and mother; Leukemia in her father; Lymphoma in her father; Stroke in her mother.      Allergies:  Allergies   Allergen Reactions   • Ace Inhibitors Other (See Comments), Swelling and Unknown - High Severity     lisinopril  lisinopril     • Omeprazole Other (See Comments) and Unknown - High Severity   • Sulfa Antibiotics Swelling   • Levofloxacin Myalgia and Other (See Comments)     Muscle pain       Medications:  Prior to Admission medications    Medication Sig Start Date End Date Taking? Authorizing Provider   amLODIPine (NORVASC) 5 MG tablet Take 1 tablet by mouth Daily. 2/8/22  Yes Thelma Foster APRN   anastrozole (ARIMIDEX) 1 MG tablet Take 1 mg by mouth.   Yes Albert Arzate MD   Biotin 5 MG tablet Take 3 capsules by mouth.   Yes Albert Arzate MD   hydroCHLOROthiazide (MICROZIDE) 12.5 MG capsule Take 12.5 mg by mouth.   Yes Albert Arzate MD   Multiple Vitamins-Calcium (ONE-A-DAY WOMENS FORMULA PO) One-A-Day Womens Formula   Yes Albert Arzate MD   potassium chloride (K-DUR,KLOR-CON) 20 MEQ CR tablet Take 20 mEq by mouth Daily.   Yes Albert Arzate MD   Probiotic Product (PROBIOTIC-10 PO) Probiotic   Yes Albert Arzate MD   psyllium (METAMUCIL SMOOTH TEXTURE) 28 % packet Take  by mouth.   Yes Albert Arzate MD   sodium chloride 0.65 % nasal spray 1 spray into the nostril(s) as directed by provider.   Yes Albert Arzate MD       Objective     Vital Signs: /77 (BP Location: Right arm, Patient Position: Sitting, Cuff Size: Adult)   Pulse 71   Temp 98 °F (36.7 °C)   Resp 16   Ht 162.6 cm (64\")   Wt 91.2 kg (201 lb)   SpO2 97%   BMI 34.50 kg/m²   Physical Exam  Constitutional:       Appearance: She is well-developed. She is obese.   HENT:      Head: Normocephalic and atraumatic.   Eyes:      General: No scleral icterus.     Conjunctiva/sclera: " Conjunctivae normal.      Pupils: Pupils are equal, round, and reactive to light.   Neck:      Vascular: No JVD.   Cardiovascular:      Rate and Rhythm: Normal rate and regular rhythm.      Heart sounds: Normal heart sounds. No murmur heard.    No friction rub. No gallop.   Pulmonary:      Effort: Pulmonary effort is normal.      Breath sounds: Normal breath sounds. No wheezing or rales.   Abdominal:      General: Bowel sounds are normal. There is no distension.      Palpations: Abdomen is soft. There is no mass.      Tenderness: There is no abdominal tenderness. There is no guarding or rebound.   Musculoskeletal:         General: Normal range of motion.      Cervical back: Neck supple.      Comments: No cyanosis or clubbing   Lymphadenopathy:      Cervical: No cervical adenopathy.   Skin:     General: Skin is warm and dry.   Neurological:      Mental Status: She is alert and oriented to person, place, and time.      Cranial Nerves: No cranial nerve deficit.   Psychiatric:         Behavior: Behavior normal.       Patient's Body mass index is 34.5 kg/m². indicating that she is obese (BMI >30). Obesity-related health conditions include the following: hypertension and dyslipidemias. Obesity is improving with lifestyle modifications. BMI is is above average; BMI management plan is completed. We discussed low calorie, low carb based diet program, portion control and increasing exercise..    Results Reviewed:  Glucose   Date Value Ref Range Status   03/10/2022 101 (H) 65 - 99 mg/dL Final   12/03/2020 91 74 - 109 mg/dL Final     BUN   Date Value Ref Range Status   03/10/2022 11 8 - 27 mg/dL Final   12/03/2020 11 6 - 20 mg/dL Final     Creatinine   Date Value Ref Range Status   03/10/2022 0.91 0.57 - 1.00 mg/dL Final   12/03/2020 0.6 0.5 - 0.9 mg/dL Final     Sodium   Date Value Ref Range Status   03/10/2022 142 134 - 144 mmol/L Final   12/03/2020 140 136 - 145 mmol/L Final     Potassium   Date Value Ref Range Status    03/10/2022 4.2 3.5 - 5.2 mmol/L Final   12/03/2020 4.0 3.5 - 5.0 mmol/L Final     Chloride   Date Value Ref Range Status   03/10/2022 101 96 - 106 mmol/L Final   12/03/2020 101 98 - 111 mmol/L Final     CO2   Date Value Ref Range Status   12/03/2020 28 22 - 29 mmol/L Final     Total CO2   Date Value Ref Range Status   03/10/2022 24 20 - 29 mmol/L Final     Calcium   Date Value Ref Range Status   03/10/2022 9.8 8.7 - 10.3 mg/dL Final   12/03/2020 9.7 8.6 - 10.0 mg/dL Final     ALT (SGPT)   Date Value Ref Range Status   03/10/2022 22 0 - 32 IU/L Final   12/03/2020 13 5 - 33 U/L Final     AST (SGOT)   Date Value Ref Range Status   03/10/2022 22 0 - 40 IU/L Final   12/03/2020 18 5 - 32 U/L Final     WBC   Date Value Ref Range Status   03/10/2022 4.8 3.4 - 10.8 x10E3/uL Final   12/03/2020 6.9 4.8 - 10.8 K/uL Final     Hematocrit   Date Value Ref Range Status   03/10/2022 44.7 34.0 - 46.6 % Final   12/03/2020 43.5 37.0 - 47.0 % Final     Platelets   Date Value Ref Range Status   03/10/2022 262 150 - 450 x10E3/uL Final   12/03/2020 264 130 - 400 K/uL Final   06/12/2019 303 150 - 450 10*3/uL Final     Triglycerides   Date Value Ref Range Status   03/10/2022 143 0 - 149 mg/dL Final     HDL Cholesterol   Date Value Ref Range Status   03/10/2022 49 >39 mg/dL Final     LDL Chol Calc (NIH)   Date Value Ref Range Status   03/10/2022 124 (H) 0 - 99 mg/dL Final         Assessment / Plan     Assessment/Plan:  Diagnoses and all orders for this visit:    1. Annual physical exam (Primary)    2. Encounter for screening for malignant neoplasm of colon     Pt to call Dr. Tom's office to reschedule. She already had an appt.    We did have an extensive discussion regarding food choices and that she needs to try to stick to the Mediterranean diet.  She does not have good food options that she prefers so we discussed shopping the perimeter of the store instead of the inside of the store and not eating as much processed foods.  Patient  does verbalize understanding    Return in about 6 months (around 9/15/2022). unless patient needs to be seen sooner or acute issues arise.    Code Status: Full.     I have discussed the patient results/orders and and plan/recommendation with them at today's visit.      Thelma Foster, APRN   03/15/2022

## 2022-04-04 RX ORDER — POTASSIUM CHLORIDE 20 MEQ/1
20 TABLET, EXTENDED RELEASE ORAL DAILY
Qty: 30 TABLET | Refills: 7 | Status: SHIPPED | OUTPATIENT
Start: 2022-04-04 | End: 2022-10-27 | Stop reason: SDUPTHER

## 2022-04-07 ENCOUNTER — TELEPHONE (OUTPATIENT)
Dept: INTERNAL MEDICINE | Facility: CLINIC | Age: 62
End: 2022-04-07

## 2022-04-07 NOTE — TELEPHONE ENCOUNTER
Called pt to check and see if she was able to reschedule colonoscopy. Pt  Stated that she had not yet, but needed to. Asked pt if there was anything I could do  To help and she stated no that she would call. Told pt to let us know if we could be of any assistance.

## 2022-04-26 ENCOUNTER — TELEPHONE (OUTPATIENT)
Dept: INTERNAL MEDICINE | Facility: CLINIC | Age: 62
End: 2022-04-26

## 2022-04-26 ENCOUNTER — CLINICAL SUPPORT (OUTPATIENT)
Dept: INTERNAL MEDICINE | Facility: CLINIC | Age: 62
End: 2022-04-26

## 2022-04-26 DIAGNOSIS — M54.50 ACUTE LOW BACK PAIN, UNSPECIFIED BACK PAIN LATERALITY, UNSPECIFIED WHETHER SCIATICA PRESENT: Primary | ICD-10-CM

## 2022-04-26 DIAGNOSIS — M54.9 BACK PAIN, UNSPECIFIED BACK LOCATION, UNSPECIFIED BACK PAIN LATERALITY, UNSPECIFIED CHRONICITY: Primary | ICD-10-CM

## 2022-04-26 LAB
BILIRUB BLD-MCNC: NEGATIVE MG/DL
CLARITY, POC: CLEAR
COLOR UR: YELLOW
GLUCOSE UR STRIP-MCNC: NEGATIVE MG/DL
KETONES UR QL: NEGATIVE
LEUKOCYTE EST, POC: ABNORMAL
NITRITE UR-MCNC: NEGATIVE MG/ML
PH UR: 7 [PH] (ref 5–8)
PROT UR STRIP-MCNC: NEGATIVE MG/DL
RBC # UR STRIP: ABNORMAL /UL
SP GR UR: 1.01 (ref 1–1.03)
UROBILINOGEN UR QL: NORMAL

## 2022-04-26 PROCEDURE — 99211 OFF/OP EST MAY X REQ PHY/QHP: CPT | Performed by: INTERNAL MEDICINE

## 2022-04-26 PROCEDURE — 81003 URINALYSIS AUTO W/O SCOPE: CPT | Performed by: INTERNAL MEDICINE

## 2022-04-26 NOTE — TELEPHONE ENCOUNTER
Caller: Telma Herrera    Relationship: Self    Best call back number: [phone]    What is the best time to reach you: ANYTIME     Who are you requesting to speak with (clinical staff, provider,  specific staff member): PROVIDER OR NURSE         What was the call regarding: PATIENT REQUESTING A CALL BACK TO DISCUSS IF SHE NEEDS TO BE SEEN PATIENT THINKS SHE MAY HAVE THROWN HER BACK OUT OR POSSIBLE KIDNEY STONE   PATIENT HAS HAD LOWER BACK AND SIDE PAIN SEVERE AT TIMES AND IT DOES COME AND GO     Do you require a callback:  YES

## 2022-04-30 LAB
BACTERIA UR CULT: ABNORMAL
OTHER ANTIBIOTIC SUSC ISLT: ABNORMAL

## 2022-05-02 DIAGNOSIS — N30.00 ACUTE CYSTITIS WITHOUT HEMATURIA: Primary | ICD-10-CM

## 2022-05-02 RX ORDER — NITROFURANTOIN 25; 75 MG/1; MG/1
100 CAPSULE ORAL 2 TIMES DAILY
Qty: 20 CAPSULE | Refills: 0 | Status: ON HOLD | OUTPATIENT
Start: 2022-05-02 | End: 2022-05-17

## 2022-05-03 ENCOUNTER — OFFICE VISIT (OUTPATIENT)
Dept: GASTROENTEROLOGY | Facility: CLINIC | Age: 62
End: 2022-05-03

## 2022-05-03 VITALS
BODY MASS INDEX: 33.97 KG/M2 | SYSTOLIC BLOOD PRESSURE: 120 MMHG | WEIGHT: 199 LBS | DIASTOLIC BLOOD PRESSURE: 86 MMHG | TEMPERATURE: 96.4 F | HEIGHT: 64 IN | OXYGEN SATURATION: 98 % | HEART RATE: 113 BPM

## 2022-05-03 DIAGNOSIS — Z12.11 ENCOUNTER FOR SCREENING FOR MALIGNANT NEOPLASM OF COLON: Primary | ICD-10-CM

## 2022-05-03 DIAGNOSIS — K64.9 HEMORRHOIDS, UNSPECIFIED HEMORRHOID TYPE: ICD-10-CM

## 2022-05-03 DIAGNOSIS — Z01.818 PREOPERATIVE TESTING: Primary | ICD-10-CM

## 2022-05-03 DIAGNOSIS — K62.5 BRBPR (BRIGHT RED BLOOD PER RECTUM): ICD-10-CM

## 2022-05-03 DIAGNOSIS — K21.9 GASTROESOPHAGEAL REFLUX DISEASE, UNSPECIFIED WHETHER ESOPHAGITIS PRESENT: ICD-10-CM

## 2022-05-03 PROCEDURE — 99214 OFFICE O/P EST MOD 30 MIN: CPT | Performed by: NURSE PRACTITIONER

## 2022-05-03 NOTE — PROGRESS NOTES
[facility]    Primary Physician Thelma Foster APRN    5/3/2022    Telma Herrera   1960      Chief Complaint   Patient presents with   • Heartburn   • Colonoscopy   • Hemorrhoids       Subjective     HPI    Telma Herrera is a 62 y.o. female who presents as a referral for preventative maintenance. She has no complaints of nausea or vomiting. No change in bowels. No wt loss. No melena. No abdominal pain.           Reflux   This has been for years intermittently manifested by heartburn. Occurs once per week. Has taken zantac in the past. Has tried not eating late. Uses tums prn that helps. Has noted improvement with weight loss. No dysphagia.               Chronic hemorrhoids   Intermittent for years. Has had intermittent bright red blood per rectum on the tissue for years. Has used otc steroid cream that helps.            Last colonoscopy was 11 years ago and was normal.  The patient does not  have history of colon polyps/colon cancer. There is not a family history of colon polyp/colon cancer.       Last egd 11 years ago showed hiatal hernia.     Past Medical History:   Diagnosis Date   • Arthritis    • Breast cancer (HCC)    • GERD (gastroesophageal reflux disease)    • Hiatal hernia    • Hypertension    • Hypokalemia    • Idiopathic hematuria    • Kidney stones    • Lipoma    • Osteopenia    • Renal disorder    • UTI (urinary tract infection)        Past Surgical History:   Procedure Laterality Date   • BREAST LUMPECTOMY     • COLONOSCOPY      2011?   • DILATION AND CURETTAGE, DIAGNOSTIC / THERAPEUTIC     • LIPOMA RESECTION     • OTHER SURGICAL HISTORY      rectocele   • WISDOM TOOTH EXTRACTION         Outpatient Medications Marked as Taking for the 5/3/22 encounter (Office Visit) with Vira Trammell APRN   Medication Sig Dispense Refill   • amLODIPine (NORVASC) 5 MG tablet Take 1 tablet by mouth Daily. 90 tablet 1   • anastrozole (ARIMIDEX) 1 MG tablet Take 1 mg by  mouth.     • Biotin 5 MG tablet Take 3 capsules by mouth.     • hydroCHLOROthiazide (MICROZIDE) 12.5 MG capsule Take 12.5 mg by mouth.     • Multiple Vitamins-Calcium (ONE-A-DAY WOMENS FORMULA PO) One-A-Day Womens Formula     • nitrofurantoin, macrocrystal-monohydrate, (Macrobid) 100 MG capsule Take 1 capsule by mouth 2 (Two) Times a Day. 20 capsule 0   • potassium chloride (K-DUR,KLOR-CON) 20 MEQ CR tablet Take 1 tablet by mouth Daily. 30 tablet 7   • Probiotic Product (PROBIOTIC-10 PO) Probiotic     • psyllium (METAMUCIL SMOOTH TEXTURE) 28 % packet Take  by mouth.     • sodium chloride 0.65 % nasal spray 1 spray into the nostril(s) as directed by provider.         Allergies   Allergen Reactions   • Ace Inhibitors Other (See Comments), Swelling and Unknown - High Severity     lisinopril  lisinopril     • Omeprazole Other (See Comments) and Unknown - High Severity   • Sulfa Antibiotics Swelling   • Levofloxacin Myalgia and Other (See Comments)     Muscle pain         Social History     Socioeconomic History   • Marital status:    Tobacco Use   • Smoking status: Never Smoker   • Smokeless tobacco: Never Used   Substance and Sexual Activity   • Alcohol use: Yes     Comment: occ   • Drug use: Not Currently   • Sexual activity: Yes     Partners: Male     Birth control/protection: Post-menopausal       Family History   Problem Relation Age of Onset   • Hypertension Mother    • Diabetes Mother    • Stroke Mother    • Leukemia Father    • Lymphoma Father    • High cholesterol Brother    • Hypertension Brother    • Diabetes Brother    • Colon cancer Neg Hx    • Colon polyps Neg Hx        Review of Systems   Constitutional: Negative for chills, fever and unexpected weight change.   Respiratory: Negative for shortness of breath and wheezing.    Cardiovascular: Negative for chest pain and palpitations.   Gastrointestinal: Negative for abdominal distention, abdominal pain, constipation, diarrhea, nausea and vomiting.        Objective     Vitals:    05/03/22 1327   BP: 120/86   Pulse: 113   Temp: 96.4 °F (35.8 °C)   SpO2: 98%         05/03/22  1327   Weight: 90.3 kg (199 lb)     Body mass index is 34.16 kg/m².    Physical Exam  Vitals reviewed.   Constitutional:       General: She is not in acute distress.  Cardiovascular:      Rate and Rhythm: Normal rate and regular rhythm.      Heart sounds: Normal heart sounds.   Pulmonary:      Effort: Pulmonary effort is normal.      Breath sounds: Normal breath sounds.   Abdominal:      General: Bowel sounds are normal. There is no distension.      Palpations: Abdomen is soft.      Tenderness: There is no abdominal tenderness.   Skin:     General: Skin is warm and dry.   Neurological:      Mental Status: She is alert.         Imaging Results (Most Recent)     None          Assessment/Plan     Diagnoses and all orders for this visit:    1. Encounter for screening for malignant neoplasm of colon (Primary)  -     Case Request; Standing  -     Case Request    2. BRBPR (bright red blood per rectum)    3. Hemorrhoids, unspecified hemorrhoid type    4. Gastroesophageal reflux disease, unspecified whether esophagitis present  -     Case Request; Standing  -     Case Request    Other orders  -     Follow Anesthesia Guidelines / Protocol; Future  -     Obtain Informed Consent; Future    Plan for colonoscopy.      In regards to bright red blood per rectum, differential diagnosis discussed.  Recommend ER if has worsening symptoms.      In regards to hemorrhoids, we did discuss sitz bath's as well as continuing over-the-counter hemorrhoidal cream/ointment.    In regards to GERD, recommend strict antireflux precautions.  I do recommend EGD to assess for any damage from acid reflux such as Jean-Baptiste's esophagus and she is agreeable.  Recommend over-the-counter Pepcid daily. I discussed non pharmaceutical treatment of gerd.  This includes gradually losing weight to achieve ideal body wt., elevation of the  head of bed by 4-6 inches, nothing to eat or drink 3 hours prior to lying down, avoiding tight clothing, stress reduction, tobacco cessation, reduction of alcohol intake, and dietary restrictions (avoiding caffeine, coffee, fatty foods, mints, chocolate, spicy foods and tomato based sauces as much as possible).                     ESOPHAGOGASTRODUODENOSCOPY WITH ANESTHESIA (N/A), COLONOSCOPY WITH ANESTHESIA (N/A)  All risks, benefits, alternatives, and indications of colonoscopy procedure have been discussed with the patient. Risks to include perforation of the colon requiring possible surgery or colostomy, risk of bleeding from biopsies or removal of colon tissue, possibility of missing a colon polyp or cancer, or adverse drug reaction.  Benefits to include the diagnosis and management of disease of the colon and rectum. Alternatives to include barium enema, radiographic evaluation, lab testing or no intervention. Pt verbalizes understanding and agrees.     Risk, benefits, and alternatives of endoscopy were explained in full.  They understand that there is a risk of bleeding, perforation, and infection.  The risk of perforation goes up with esophageal dilation.  Other options to evaluate UGI complaints could involve barium swallow or UGI series, but these would be diagnostic tests only.  Patient was given time to ask questions.  I answered them to their satisfaction and they are agreeable to proceeding        RAYRAY Dee

## 2022-05-04 PROBLEM — Z12.11 ENCOUNTER FOR SCREENING FOR MALIGNANT NEOPLASM OF COLON: Status: ACTIVE | Noted: 2022-05-04

## 2022-05-13 ENCOUNTER — LAB (OUTPATIENT)
Dept: LAB | Facility: HOSPITAL | Age: 62
End: 2022-05-13

## 2022-05-13 LAB — SARS-COV-2 ORF1AB RESP QL NAA+PROBE: NOT DETECTED

## 2022-05-13 PROCEDURE — C9803 HOPD COVID-19 SPEC COLLECT: HCPCS

## 2022-05-13 PROCEDURE — U0004 COV-19 TEST NON-CDC HGH THRU: HCPCS | Performed by: NURSE PRACTITIONER

## 2022-05-17 ENCOUNTER — TELEPHONE (OUTPATIENT)
Dept: GASTROENTEROLOGY | Facility: CLINIC | Age: 62
End: 2022-05-17

## 2022-05-17 ENCOUNTER — ANESTHESIA EVENT (OUTPATIENT)
Dept: GASTROENTEROLOGY | Facility: HOSPITAL | Age: 62
End: 2022-05-17

## 2022-05-17 ENCOUNTER — ANESTHESIA (OUTPATIENT)
Dept: GASTROENTEROLOGY | Facility: HOSPITAL | Age: 62
End: 2022-05-17

## 2022-05-17 ENCOUNTER — HOSPITAL ENCOUNTER (OUTPATIENT)
Facility: HOSPITAL | Age: 62
Setting detail: HOSPITAL OUTPATIENT SURGERY
Discharge: HOME | End: 2022-05-17
Attending: INTERNAL MEDICINE | Admitting: INTERNAL MEDICINE

## 2022-05-17 VITALS
HEART RATE: 97 BPM | DIASTOLIC BLOOD PRESSURE: 78 MMHG | SYSTOLIC BLOOD PRESSURE: 128 MMHG | TEMPERATURE: 97.4 F | BODY MASS INDEX: 33.29 KG/M2 | OXYGEN SATURATION: 96 % | HEIGHT: 64 IN | WEIGHT: 195 LBS | RESPIRATION RATE: 16 BRPM

## 2022-05-17 PROCEDURE — 45378 DIAGNOSTIC COLONOSCOPY: CPT | Performed by: INTERNAL MEDICINE

## 2022-05-17 PROCEDURE — 25010000002 PROPOFOL 10 MG/ML EMULSION: Performed by: NURSE ANESTHETIST, CERTIFIED REGISTERED

## 2022-05-17 PROCEDURE — 43239 EGD BIOPSY SINGLE/MULTIPLE: CPT | Performed by: INTERNAL MEDICINE

## 2022-05-17 RX ORDER — PROPOFOL 10 MG/ML
VIAL (ML) INTRAVENOUS AS NEEDED
Status: DISCONTINUED | OUTPATIENT
Start: 2022-05-17 | End: 2022-05-17 | Stop reason: SURG

## 2022-05-17 RX ORDER — LIDOCAINE HYDROCHLORIDE 10 MG/ML
0.5 INJECTION, SOLUTION EPIDURAL; INFILTRATION; INTRACAUDAL; PERINEURAL ONCE AS NEEDED
Status: DISCONTINUED | OUTPATIENT
Start: 2022-05-17 | End: 2022-05-17 | Stop reason: HOSPADM

## 2022-05-17 RX ORDER — ONDANSETRON 2 MG/ML
4 INJECTION INTRAMUSCULAR; INTRAVENOUS ONCE AS NEEDED
Status: DISCONTINUED | OUTPATIENT
Start: 2022-05-17 | End: 2022-05-17 | Stop reason: HOSPADM

## 2022-05-17 RX ORDER — SODIUM CHLORIDE 0.9 % (FLUSH) 0.9 %
10 SYRINGE (ML) INJECTION AS NEEDED
Status: DISCONTINUED | OUTPATIENT
Start: 2022-05-17 | End: 2022-05-17 | Stop reason: HOSPADM

## 2022-05-17 RX ORDER — LIDOCAINE HYDROCHLORIDE 20 MG/ML
INJECTION, SOLUTION EPIDURAL; INFILTRATION; INTRACAUDAL; PERINEURAL AS NEEDED
Status: DISCONTINUED | OUTPATIENT
Start: 2022-05-17 | End: 2022-05-17 | Stop reason: SURG

## 2022-05-17 RX ORDER — SODIUM CHLORIDE 9 MG/ML
500 INJECTION, SOLUTION INTRAVENOUS CONTINUOUS PRN
Status: DISCONTINUED | OUTPATIENT
Start: 2022-05-17 | End: 2022-05-17 | Stop reason: HOSPADM

## 2022-05-17 RX ADMIN — SODIUM CHLORIDE 500 ML: 9 INJECTION, SOLUTION INTRAVENOUS at 09:50

## 2022-05-17 RX ADMIN — LIDOCAINE HYDROCHLORIDE 100 MG: 20 INJECTION, SOLUTION EPIDURAL; INFILTRATION; INTRACAUDAL; PERINEURAL at 11:57

## 2022-05-17 RX ADMIN — PROPOFOL 500 MG: 10 INJECTION, EMULSION INTRAVENOUS at 11:57

## 2022-05-17 NOTE — ANESTHESIA POSTPROCEDURE EVALUATION
"Patient: Telma Herrera    Procedure Summary     Date: 05/17/22 Room / Location:  [facility] /  [facility]    Anesthesia Start: 1153 Anesthesia Stop: 1230    Procedures:       ESOPHAGOGASTRODUODENOSCOPY WITH ANESTHESIA (N/A )      COLONOSCOPY WITH ANESTHESIA (N/A ) Diagnosis:       Encounter for screening for malignant neoplasm of colon      Gastroesophageal reflux disease, unspecified whether esophagitis present      (Encounter for screening for malignant neoplasm of colon [Z12.11])      (Gastroesophageal reflux disease, unspecified whether esophagitis present [K21.9])    Surgeons: Saeed Tom MD Provider: Thong Rhodes CRNA    Anesthesia Type: MAC ASA Status: 2          Anesthesia Type: MAC    Vitals  Vitals Value Taken Time   /65 05/17/22 1241   Temp     Pulse 99 05/17/22 1243   Resp 25 05/17/22 1230   SpO2 96 % 05/17/22 1243   Vitals shown include unvalidated device data.        Post Anesthesia Care and Evaluation    Patient location during evaluation: PACU  Patient participation: complete - patient participated  Level of consciousness: awake and alert  Pain management: adequate  Airway patency: patent  Anesthetic complications: No anesthetic complications    Cardiovascular status: acceptable  Respiratory status: acceptable  Hydration status: acceptable    Comments: Blood pressure 107/61, pulse 106, temperature 97.4 °F (36.3 °C), temperature source Temporal, resp. rate 25, height 162.6 cm (64\"), weight 88.5 kg (195 lb), SpO2 95 %.    Pt discharged from PACU based on chavo score >8      "

## 2022-05-17 NOTE — ANESTHESIA PREPROCEDURE EVALUATION
Anesthesia Evaluation     Patient summary reviewed   no history of anesthetic complications:  NPO Solid Status: > 8 hours             Airway   Mallampati: II  Dental      Pulmonary - negative pulmonary ROS   Cardiovascular   Exercise tolerance: excellent (>7 METS)    (+) hypertension,       Neuro/Psych- negative ROS  GI/Hepatic/Renal/Endo    (+) obesity,  GERD,      Musculoskeletal     Abdominal    Substance History      OB/GYN          Other                        Anesthesia Plan    ASA 2     MAC       Anesthetic plan, all risks, benefits, and alternatives have been provided, discussed and informed consent has been obtained with: patient.        CODE STATUS:

## 2022-05-26 ENCOUNTER — TELEPHONE (OUTPATIENT)
Dept: GASTROENTEROLOGY | Facility: CLINIC | Age: 62
End: 2022-05-26

## 2022-05-26 NOTE — TELEPHONE ENCOUNTER
This is a mychart message. I don't see the pathology and have called pathology 3 times and they have not returned my call.      ----- Message from Telma Herrera sent at 5/25/2022  2:19 PM CDT -----  Regarding: Endo Biopsy Results  Do you have any results from the biopsy from my endoscopy on May 17th?    Thank you,     Telma

## 2022-05-26 NOTE — TELEPHONE ENCOUNTER
I called the patient today to talk about her pathology results from her endoscopy.  Unfortunately, as I explained to her, there is no pathology results.  For an unknown reason at this time, the pathology specimen was not sent to the lab.  Therefore, no results are done.  I have been investigating today trying to figure out why the endoscopy department did not send the pathology.  The pathology lab thinks they may have found the specimen, but it does not have her name labeled to it, therefore  we can not absolutely say that that specimen belongs to her.  As I discussed with her, that is not acceptable to me and it is a useless specimen.  I apologized to her that the specimen was lost.    We then talked about why redoing the biopsy to begin with.  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  I discussed Jean-Baptiste's disease with her.  We discussed the different stages of Jean-Baptiste's such as Jean-Baptiste's without dysplasia, Jean-Baptiste's with low-grade dysplasia, Jean-Baptiste's of high-grade dysplasia and the risk of developing esophageal cancer.  Because there is a suggestion of possible short segment Jean-Baptiste's, I still recommend obtain pathology results.  This would require repeat endoscopy exam.  We discussed that endoscopy does not require a prep but she would have to be n.p.o. after midnight.  Everything else would be very similar to when she came in the last time for endoscopy and colonoscopy except we would not be doing the colonoscopy portion.    I did discuss with her that Km, the head of the endoscopy department, has been made aware of this situation and that the patient would need a repeat endoscopy exam.  I have been informed from Nancy and Carlos that this could be performed without cost to the patient per Km.    The patient expressed good understanding.  She was appreciative of the call.  She states she wants to find out for peace of mind if she does have  Jean-Baptiste's or not.  She is willing to pursue repeat endoscopy examination with biopsies.    Feli, will you please contact the patient and arrange for endoscopy.    Genie, will you please work with Crow from the endoscopy department to arrange the endoscopy to be no cost to the patient as Km is indicated.  Please confirm with Km and make certain that this situation is taken care of.    I did asked the patient to call us if she has any additional thoughts, questions or concerns.

## 2022-05-31 ENCOUNTER — PREP FOR SURGERY (OUTPATIENT)
Dept: OTHER | Facility: HOSPITAL | Age: 62
End: 2022-05-31

## 2022-05-31 DIAGNOSIS — Z01.818 PREOPERATIVE TESTING: Primary | ICD-10-CM

## 2022-05-31 DIAGNOSIS — K21.9 GASTROESOPHAGEAL REFLUX DISEASE, UNSPECIFIED WHETHER ESOPHAGITIS PRESENT: Primary | ICD-10-CM

## 2022-05-31 NOTE — TELEPHONE ENCOUNTER
I spoke to Km last week.  He was going to check with anesthesia t about their charges since that is a different entity from the hospital.  He had approval for the hospital not to charge for colonoscopy.  From our standpoint, we are different entity than the hospital.  So we have to make sure [facility] is not going to charge for my services for the endoscopy scheduled [facility].  I need you to please look into that and need to make certain we have an answer before [facility].  Thanks

## 2022-05-31 NOTE — TELEPHONE ENCOUNTER
Spoke to [employer] in endo.  She will get confirmation via email from [employer] that this will be at no cost.

## 2022-06-01 ENCOUNTER — LAB (OUTPATIENT)
Dept: LAB | Facility: HOSPITAL | Age: 62
End: 2022-06-01

## 2022-06-01 LAB — SARS-COV-2 ORF1AB RESP QL NAA+PROBE: NOT DETECTED

## 2022-06-01 PROCEDURE — U0004 COV-19 TEST NON-CDC HGH THRU: HCPCS | Performed by: INTERNAL MEDICINE

## 2022-06-01 PROCEDURE — C9803 HOPD COVID-19 SPEC COLLECT: HCPCS | Performed by: INTERNAL MEDICINE

## 2022-06-03 ENCOUNTER — ANESTHESIA (OUTPATIENT)
Dept: GASTROENTEROLOGY | Facility: HOSPITAL | Age: 62
End: 2022-06-03

## 2022-06-03 ENCOUNTER — ANESTHESIA EVENT (OUTPATIENT)
Dept: GASTROENTEROLOGY | Facility: HOSPITAL | Age: 62
End: 2022-06-03

## 2022-06-03 ENCOUNTER — HOSPITAL ENCOUNTER (OUTPATIENT)
Facility: HOSPITAL | Age: 62
Setting detail: HOSPITAL OUTPATIENT SURGERY
Discharge: HOME | End: 2022-06-03
Attending: INTERNAL MEDICINE | Admitting: INTERNAL MEDICINE

## 2022-06-03 VITALS
OXYGEN SATURATION: 98 % | DIASTOLIC BLOOD PRESSURE: 68 MMHG | TEMPERATURE: 97.9 F | BODY MASS INDEX: 33.75 KG/M2 | HEART RATE: 92 BPM | SYSTOLIC BLOOD PRESSURE: 122 MMHG | WEIGHT: 197.7 LBS | HEIGHT: 64 IN | RESPIRATION RATE: 18 BRPM

## 2022-06-03 DIAGNOSIS — K21.9 GASTROESOPHAGEAL REFLUX DISEASE, UNSPECIFIED WHETHER ESOPHAGITIS PRESENT: ICD-10-CM

## 2022-06-03 PROCEDURE — 43239 EGD BIOPSY SINGLE/MULTIPLE: CPT | Performed by: INTERNAL MEDICINE

## 2022-06-03 PROCEDURE — 25010000002 PROPOFOL 10 MG/ML EMULSION: Performed by: NURSE ANESTHETIST, CERTIFIED REGISTERED

## 2022-06-03 PROCEDURE — 88305 TISSUE EXAM BY PATHOLOGIST: CPT | Performed by: INTERNAL MEDICINE

## 2022-06-03 RX ORDER — LIDOCAINE HYDROCHLORIDE 20 MG/ML
INJECTION, SOLUTION EPIDURAL; INFILTRATION; INTRACAUDAL; PERINEURAL AS NEEDED
Status: DISCONTINUED | OUTPATIENT
Start: 2022-06-03 | End: 2022-06-03 | Stop reason: SURG

## 2022-06-03 RX ORDER — ONDANSETRON 2 MG/ML
4 INJECTION INTRAMUSCULAR; INTRAVENOUS ONCE AS NEEDED
Status: DISCONTINUED | OUTPATIENT
Start: 2022-06-03 | End: 2022-06-03 | Stop reason: HOSPADM

## 2022-06-03 RX ORDER — LIDOCAINE HYDROCHLORIDE 10 MG/ML
0.5 INJECTION, SOLUTION EPIDURAL; INFILTRATION; INTRACAUDAL; PERINEURAL ONCE AS NEEDED
Status: DISCONTINUED | OUTPATIENT
Start: 2022-06-03 | End: 2022-06-03 | Stop reason: HOSPADM

## 2022-06-03 RX ORDER — SODIUM CHLORIDE 0.9 % (FLUSH) 0.9 %
10 SYRINGE (ML) INJECTION AS NEEDED
Status: CANCELLED | OUTPATIENT
Start: 2022-06-03

## 2022-06-03 RX ORDER — SODIUM CHLORIDE 0.9 % (FLUSH) 0.9 %
10 SYRINGE (ML) INJECTION EVERY 12 HOURS SCHEDULED
Status: CANCELLED | OUTPATIENT
Start: 2022-06-03

## 2022-06-03 RX ORDER — SODIUM CHLORIDE 9 MG/ML
500 INJECTION, SOLUTION INTRAVENOUS CONTINUOUS PRN
Status: DISCONTINUED | OUTPATIENT
Start: 2022-06-03 | End: 2022-06-03 | Stop reason: HOSPADM

## 2022-06-03 RX ORDER — SODIUM CHLORIDE 9 MG/ML
100 INJECTION, SOLUTION INTRAVENOUS CONTINUOUS
Status: CANCELLED | OUTPATIENT
Start: 2022-06-03

## 2022-06-03 RX ORDER — PROPOFOL 10 MG/ML
VIAL (ML) INTRAVENOUS AS NEEDED
Status: DISCONTINUED | OUTPATIENT
Start: 2022-06-03 | End: 2022-06-03 | Stop reason: SURG

## 2022-06-03 RX ORDER — SODIUM CHLORIDE 0.9 % (FLUSH) 0.9 %
10 SYRINGE (ML) INJECTION AS NEEDED
Status: DISCONTINUED | OUTPATIENT
Start: 2022-06-03 | End: 2022-06-03 | Stop reason: HOSPADM

## 2022-06-03 RX ADMIN — SODIUM CHLORIDE 500 ML: 9 INJECTION, SOLUTION INTRAVENOUS at 08:48

## 2022-06-03 RX ADMIN — PROPOFOL 100 MG: 10 INJECTION, EMULSION INTRAVENOUS at 10:31

## 2022-06-03 RX ADMIN — LIDOCAINE HYDROCHLORIDE 160 MG: 20 INJECTION, SOLUTION EPIDURAL; INFILTRATION; INTRACAUDAL; PERINEURAL at 10:31

## 2022-06-03 NOTE — ANESTHESIA POSTPROCEDURE EVALUATION
"Patient: Telma Herrera    Procedure Summary     Date: 06/03/22 Room / Location: [facility] ENDOSCOPY 6 / [facility] END[facility]    Anesthesia Start: 1027 Anesthesia Stop: 1045    Procedure: ESOPHAGOGASTRODUODENOSCOPY (N/A ) Diagnosis:       Gastroesophageal reflux disease, unspecified whether esophagitis present      (Gastroesophageal reflux disease, unspecified whether esophagitis present [K21.9])    Surgeons: Saeed Tom MD Provider: Roland Landaverde CRNA    Anesthesia Type: MAC ASA Status: 2          Anesthesia Type: MAC    Vitals  Vitals Value Taken Time   /72 06/03/22 1043   Temp     Pulse 93 06/03/22 1043   Resp 20 06/03/22 1043   SpO2 20 % 06/03/22 1043           Post Anesthesia Care and Evaluation    Patient location during evaluation: PACU  Patient participation: complete - patient participated  Level of consciousness: awake and alert  Pain management: adequate  Airway patency: patent  Anesthetic complications: No anesthetic complications    Cardiovascular status: acceptable  Respiratory status: acceptable  Hydration status: acceptable    Comments: Blood pressure 124/72, pulse 93, temperature 97.9 °F (36.6 °C), temperature source Temporal, resp. rate 20, height 162.6 cm (64\"), weight 89.7 kg (197 lb 11.2 oz), SpO2 (!) 20 %.    Pt discharged from PACU based on chavo score >8      "

## 2022-06-03 NOTE — ANESTHESIA PREPROCEDURE EVALUATION
Anesthesia Evaluation     Patient summary reviewed   no history of anesthetic complications:  NPO Solid Status: > 8 hours             Airway   Mallampati: II  Dental      Pulmonary - negative pulmonary ROS   (-) not a smoker  Cardiovascular   Exercise tolerance: excellent (>7 METS)    (+) hypertension,       Neuro/Psych- negative ROS  GI/Hepatic/Renal/Endo    (+) obesity,  GERD,      Musculoskeletal     Abdominal    Substance History      OB/GYN          Other   arthritis,    history of cancer                      Anesthesia Plan    ASA 2     MAC     intravenous induction     Anesthetic plan, all risks, benefits, and alternatives have been provided, discussed and informed consent has been obtained with: patient and spouse/significant other.        CODE STATUS:

## 2022-06-06 ENCOUNTER — TELEPHONE (OUTPATIENT)
Dept: GASTROENTEROLOGY | Facility: CLINIC | Age: 62
End: 2022-06-06

## 2022-06-06 LAB
CYTO UR: NORMAL
LAB AP CASE REPORT: NORMAL
Lab: NORMAL
PATH REPORT.FINAL DX SPEC: NORMAL
PATH REPORT.GROSS SPEC: NORMAL

## 2022-06-06 RX ORDER — PANTOPRAZOLE SODIUM 40 MG/1
40 TABLET, DELAYED RELEASE ORAL DAILY
Qty: 30 TABLET | Refills: 11 | Status: SHIPPED | OUTPATIENT
Start: 2022-06-06

## 2022-06-06 NOTE — TELEPHONE ENCOUNTER
I called her to discuss her biopsy results.  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  We previously talked about Jean-Baptiste's disease and its potential to progress to esophageal cancer.  The risk is small.  I discussed that there is no dysplasia.  We talked about treatment options.    1 option is to continue twice daily Pepcid.  Some believe that this is adequate therapy.  Some experts feel that more vigorous acid suppression would be more beneficial in helping to prevent progression of the Jean-Baptiste's to esophageal cancer.  The next step in medical therapy up from Pepcid would be PPI therapy.  She has a history of allergic reaction to omeprazole.  She states that the reaction she had was swelling of her lips with tiny white sores.  It was not herpetic in nature.  She also had this allergic reaction with ACE inhibitor's and sulfa drugs.  She never had any shortness of breath or any other allergic features.    I discussed that there is a risk starting a different PPI such as pantoprazole that she may have the same allergic reaction.  And worst-case scenario she could have full-blown anaphylaxis with sudden life-threatening shortness of breath.  This is low likelihood but possible.  She expressed good understanding.  She would like to try the pantoprazole.  She wants to be more preventative than not.  She is a survivor of breast cancer and would like to prevent any future cancers as best possible.  I think it is a reasonable decision.    I prescribed pantoprazole 40 mg to be taken once every morning.  I suggest that she continue the Pepcid twice daily when she first starts the pantoprazole as the Pepcid may help limit a allergic reaction.  She also take Benadryl to help prevent allergic reaction.  If she develops any symptoms then she is to let us know.  At that point we will stop the pantoprazole and go back to staying on twice daily Pepcid.  She is in agreement with this approach.    Next, at her side effects we discussed  include calcium absorption with acid lowering medicines.  She does have a history of 1 kidney stone in the past.  I did suggest she increase her calcium intake.  She may want to talk to her primary care provider about how much calcium could be beneficial.  She states she has a little bit of osteopenia last time it was checked but it was age-appropriate.    Lastly, I do recommend follow-up endoscopy examination in 1 year.  This would be surveillance.  She should come see us in the interim with any troubles or concerns.  She was in agreement.

## 2022-08-08 DIAGNOSIS — I10 PRIMARY HYPERTENSION: ICD-10-CM

## 2022-08-08 RX ORDER — AMLODIPINE BESYLATE 5 MG/1
TABLET ORAL
Qty: 90 TABLET | Refills: 1 | Status: SHIPPED | OUTPATIENT
Start: 2022-08-08 | End: 2023-01-16 | Stop reason: SDUPTHER

## 2022-08-08 NOTE — TELEPHONE ENCOUNTER
Rx Refill Note  Requested Prescriptions     Pending Prescriptions Disp Refills   • amLODIPine (NORVASC) 5 MG tablet [Pharmacy Med Name: AMLODIPINE BESYLATE 5 MG TAB] 90 tablet 1     Sig: TAKE 1 TABLET BY MOUTH EVERY DAY      Last office visit with prescribing clinician: 3/15/2022      Next office visit with prescribing clinician: 9/15/2022            Deedee Lopez RN  08/08/22, 07:25 CDT

## 2022-09-12 ENCOUNTER — CLINICAL SUPPORT (OUTPATIENT)
Dept: INTERNAL MEDICINE | Facility: CLINIC | Age: 62
End: 2022-09-12

## 2022-09-12 DIAGNOSIS — I10 PRIMARY HYPERTENSION: Primary | ICD-10-CM

## 2022-09-12 DIAGNOSIS — E78.5 HYPERLIPIDEMIA, UNSPECIFIED HYPERLIPIDEMIA TYPE: ICD-10-CM

## 2022-09-12 NOTE — PROGRESS NOTES
Venipuncture Blood Specimen Collection  Venipuncture performed in the right ac by Goldie Lozano CMA with good hemostasis. Patient tolerated the procedure well without complications.   09/12/22   Goldie Lozano CMA

## 2022-09-13 LAB
ALBUMIN SERPL-MCNC: 4.9 G/DL (ref 3.8–4.8)
ALBUMIN/GLOB SERPL: 2.2 {RATIO} (ref 1.2–2.2)
ALP SERPL-CCNC: 148 IU/L (ref 44–121)
ALT SERPL-CCNC: 18 IU/L (ref 0–32)
AST SERPL-CCNC: 21 IU/L (ref 0–40)
BILIRUB SERPL-MCNC: 0.4 MG/DL (ref 0–1.2)
BUN SERPL-MCNC: 14 MG/DL (ref 8–27)
BUN/CREAT SERPL: 18 (ref 12–28)
CALCIUM SERPL-MCNC: 9.9 MG/DL (ref 8.7–10.3)
CHLORIDE SERPL-SCNC: 102 MMOL/L (ref 96–106)
CHOLEST SERPL-MCNC: 176 MG/DL (ref 100–199)
CO2 SERPL-SCNC: 25 MMOL/L (ref 20–29)
CREAT SERPL-MCNC: 0.79 MG/DL (ref 0.57–1)
EGFRCR-CYS SERPLBLD CKD-EPI 2021: 85 ML/MIN/1.73
GLOBULIN SER CALC-MCNC: 2.2 G/DL (ref 1.5–4.5)
GLUCOSE SERPL-MCNC: 94 MG/DL (ref 65–99)
HDLC SERPL-MCNC: 48 MG/DL
LDLC SERPL CALC-MCNC: 103 MG/DL (ref 0–99)
POTASSIUM SERPL-SCNC: 4 MMOL/L (ref 3.5–5.2)
PROT SERPL-MCNC: 7.1 G/DL (ref 6–8.5)
SODIUM SERPL-SCNC: 143 MMOL/L (ref 134–144)
TRIGL SERPL-MCNC: 141 MG/DL (ref 0–149)
VLDLC SERPL CALC-MCNC: 25 MG/DL (ref 5–40)

## 2022-09-15 ENCOUNTER — OFFICE VISIT (OUTPATIENT)
Dept: INTERNAL MEDICINE | Facility: CLINIC | Age: 62
End: 2022-09-15

## 2022-09-15 VITALS
SYSTOLIC BLOOD PRESSURE: 125 MMHG | DIASTOLIC BLOOD PRESSURE: 78 MMHG | BODY MASS INDEX: 32.78 KG/M2 | HEIGHT: 64 IN | TEMPERATURE: 98 F | WEIGHT: 192 LBS | RESPIRATION RATE: 17 BRPM | HEART RATE: 78 BPM | OXYGEN SATURATION: 97 %

## 2022-09-15 DIAGNOSIS — I10 PRIMARY HYPERTENSION: ICD-10-CM

## 2022-09-15 DIAGNOSIS — E78.5 HYPERLIPIDEMIA, UNSPECIFIED HYPERLIPIDEMIA TYPE: Primary | ICD-10-CM

## 2022-09-15 PROCEDURE — 99214 OFFICE O/P EST MOD 30 MIN: CPT | Performed by: NURSE PRACTITIONER

## 2022-09-15 RX ORDER — ANASTROZOLE 1 MG/1
TABLET ORAL DAILY
COMMUNITY

## 2022-09-15 NOTE — PROGRESS NOTES
"        Subjective     Chief Complaint   Patient presents with   • Hypertension       History of Present Illness  Telma Herrera is a 62-year-old female who presents to clinic today for follow up of hypertension. She was last seen in clinic 6 months ago.    GERD  Since her last visit, she has had a colonoscopy and upper GI endoscopy performed by Dr. Tom. She states that there were issues with the upper GI endoscopy in that they \"lost her biopsy\", therefore, she had to have this repeated. 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 She was prescribed Protonix 40 mg for this and states this has been helping with her reflux. She states that Dr. Tom informed her that she will likely need to take Protonix for the remainder of her life. She reports that Dr. Tom told her she was at stage II. She will follow up with Dr. Tom in 06/2023 for a repeat endoscopy.     High LDL cholesterol  The patient notes that when she had laboratory studies performed in 03/2022, her LDL cholesterol was high, however labs drawn on 09/12/2022 show a decrease in LDL cholesterol. She reports that her LDL cholesterol was high as she was eating a handful of peanut butter pretzels everyday. She has since stopped eating these and began eating more healthy foods. She feels that her LDL cholesterol levels will be within normal range the next time that she has labs drawn.     History of breast cancer  She reports having breast cancer approximately 30 years ago, but denies having any further issues with this since that time. She takes Arimidex for this.     Elevated alkaline phosphatase levels  She notes that her alkaline phosphatase levels on the laboratory studies drawn 09/12/2022 are slightly high and her oncologist at [facility] is monitoring this. She states that her oncologist is willing to perform a PET scan if she becomes worried, but her oncologist is not overly concerned. "     Weight loss  The patient states that she has done the low-protein Keto diet in the past, and lost 65 pounds with this. She notes that while on the Keto diet, her LDL cholesterol decreased, however her HDL went up. She states that Keto is no longer effective as she is on Arimidex. However, she is currently trying the Mediterranean diet and notes that she has lost 10 to 12 pounds over the last 6 months. She eats grilled salmon, baked salmon, and canned salmon patties more frequently. She is not particularly fond of eating salmon and notes she may try cod in the near future so that she continues to consume omega-3 regularly. She also has reduced her intake of ground beef and replaced this with ground turkey.     Hypertension  The patient reports that she is still taking amlodipine and hydrochlorothiazide as prescribed.     She denies experiencing abdominal pain, chest pain, or shortness of breath. She denies any issues with bowel or bladder.     Patient's PMR from outside medical facility reviewed and noted.    Review of Systems   Constitutional: Negative for activity change, appetite change, chills and fever.   HENT: Negative for hearing loss, nosebleeds, tinnitus and trouble swallowing.    Eyes: Negative for visual disturbance.   Respiratory: Negative for cough, chest tightness, shortness of breath and wheezing.    Cardiovascular: Negative for chest pain, palpitations and leg swelling.   Gastrointestinal: Negative for abdominal distention, abdominal pain, blood in stool, constipation, diarrhea, nausea and vomiting.   Endocrine: Negative for cold intolerance, heat intolerance, polydipsia, polyphagia and polyuria.   Genitourinary: Negative for decreased urine volume, difficulty urinating, dysuria, flank pain, frequency and hematuria.   Musculoskeletal: Negative for arthralgias, joint swelling and myalgias.   Skin: Negative for rash.   Allergic/Immunologic: Negative for immunocompromised state.   Neurological:  Negative for dizziness, syncope, weakness, light-headedness and headaches.   Hematological: Negative for adenopathy. Does not bruise/bleed easily.   Psychiatric/Behavioral: Negative for confusion and sleep disturbance. The patient is not nervous/anxious.         Past Medical History:   Past Medical History:   Diagnosis Date   • Arthritis    • Breast cancer (HCC)    • GERD (gastroesophageal reflux disease)    • Hiatal hernia    • Hypertension    • Hypokalemia    • Idiopathic hematuria    • Kidney stones    • Lipoma    • Osteopenia    • Renal disorder    • UTI (urinary tract infection)      Past Surgical History:  Past Surgical History:   Procedure Laterality Date   • BREAST LUMPECTOMY     • COLONOSCOPY      2011?   • COLONOSCOPY N/A 5/17/2022    Procedure: COLONOSCOPY WITH ANESTHESIA;  Surgeon: Saeed Tom MD;  Location: [facility] ENDOSCOPY;  Service: Gastroenterology;  Laterality: N/A;  preop; screening   postop; diverticulosis ; hemmhroids   PCP Shanelle Foster   • DILATION AND CURETTAGE, DIAGNOSTIC / THERAPEUTIC     • ENDOSCOPY N/A 5/17/2022    Procedure: ESOPHAGOGASTRODUODENOSCOPY WITH ANESTHESIA;  Surgeon: Saeed Tom MD;  Location: [facility] ENDOSCOPY;  Service: Gastroenterology;  Laterality: N/A;  preop; gerd  postop; esophagitis   PCP Shanelle Foster   • ENDOSCOPY N/A 6/3/2022    Procedure: ESOPHAGOGASTRODUODENOSCOPY;  Surgeon: Saeed Tom MD;  Location: [facility] ENDOSCOPY;  Service: Gastroenterology;  Laterality: N/A;  preop; GERD  postop hiatal hernia  PCP Thelma Foster    • LIPOMA RESECTION     • OTHER SURGICAL HISTORY      rectocele   • RECTOCELE REPAIR     • WISDOM TOOTH EXTRACTION       Social History:  reports that she has never smoked. She has never used smokeless tobacco. She reports current alcohol use. She reports previous drug use.    Family History: family history includes Diabetes in her brother and mother; High cholesterol in her brother; Hypertension in her brother and mother;  "Leukemia in her father; Lymphoma in her father; Stroke in her mother.      Allergies:  Allergies   Allergen Reactions   • Ace Inhibitors Other (See Comments), Swelling and Unknown - High Severity     lisinopril  lisinopril     • Omeprazole Other (See Comments) and Unknown - High Severity     Developed swollen lips with tiny white sores   • Sulfa Antibiotics Swelling   • Levofloxacin Myalgia and Other (See Comments)     Muscle pain       Medications:  Prior to Admission medications    Medication Sig Start Date End Date Taking? Authorizing Provider   amLODIPine (NORVASC) 5 MG tablet TAKE 1 TABLET BY MOUTH EVERY DAY 8/8/22   Thelma Foster APRN   anastrozole (ARIMIDEX) 1 MG tablet Take  by mouth Daily.    Albert Arzate MD   Biotin 5 MG tablet Take 3 capsules by mouth.    Albert Arzate MD   hydroCHLOROthiazide (MICROZIDE) 12.5 MG capsule Take 12.5 mg by mouth.    Albert Arzate MD   Multiple Vitamins-Calcium (ONE-A-DAY WOMENS FORMULA PO) One-A-Day Womens Formula    Albert Arzate MD   pantoprazole (PROTONIX) 40 MG EC tablet Take 1 tablet by mouth Daily. 6/6/22   Saeed Tom MD   potassium chloride (K-DUR,KLOR-CON) 20 MEQ CR tablet Take 1 tablet by mouth Daily. 4/4/22   Thelma Foster APRN   Probiotic Product (PROBIOTIC-10 PO) Probiotic    Albert Arzate MD   psyllium (METAMUCIL SMOOTH TEXTURE) 28 % packet Take  by mouth.    Albert Arzate MD   anastrozole (ARIMIDEX) 1 MG tablet Take 1 mg by mouth.  9/15/22  Albert Arzate MD           Objective     Vital Signs: /78   Pulse 78   Temp 98 °F (36.7 °C)   Resp 17   Ht 162.6 cm (64\")   Wt 87.1 kg (192 lb)   SpO2 97%   BMI 32.96 kg/m²   Physical Exam  Vitals reviewed.   Constitutional:       Appearance: She is well-developed. She is obese.   HENT:      Head: Normocephalic and atraumatic.   Eyes:      Pupils: Pupils are equal, round, and reactive to light.   Neck:      Vascular: No JVD. "   Cardiovascular:      Rate and Rhythm: Normal rate and regular rhythm.   Pulmonary:      Effort: Pulmonary effort is normal.   Abdominal:      General: Bowel sounds are normal.      Palpations: Abdomen is soft.   Musculoskeletal:         General: No deformity.      Cervical back: Normal range of motion and neck supple.   Lymphadenopathy:      Cervical: No cervical adenopathy.   Skin:     General: Skin is warm and dry.   Neurological:      Mental Status: She is alert and oriented to person, place, and time.   Psychiatric:         Behavior: Behavior normal.         Thought Content: Thought content normal.         Judgment: Judgment normal.         BMI is >= 30 and <35. (Class 1 Obesity). The following options were offered after discussion;: exercise counseling/recommendations and nutrition counseling/recommendations      Results Reviewed:  Glucose   Date Value Ref Range Status   09/12/2022 94 65 - 99 mg/dL Final   12/03/2020 91 74 - 109 mg/dL Final     BUN   Date Value Ref Range Status   09/12/2022 14 8 - 27 mg/dL Final   12/03/2020 11 6 - 20 mg/dL Final     Creatinine   Date Value Ref Range Status   09/12/2022 0.79 0.57 - 1.00 mg/dL Final   12/03/2020 0.6 0.5 - 0.9 mg/dL Final     Sodium   Date Value Ref Range Status   09/12/2022 143 134 - 144 mmol/L Final   12/03/2020 140 136 - 145 mmol/L Final     Potassium   Date Value Ref Range Status   09/12/2022 4.0 3.5 - 5.2 mmol/L Final   12/03/2020 4.0 3.5 - 5.0 mmol/L Final     Chloride   Date Value Ref Range Status   09/12/2022 102 96 - 106 mmol/L Final   12/03/2020 101 98 - 111 mmol/L Final     CO2   Date Value Ref Range Status   12/03/2020 28 22 - 29 mmol/L Final     Total CO2   Date Value Ref Range Status   09/12/2022 25 20 - 29 mmol/L Final     Calcium   Date Value Ref Range Status   09/12/2022 9.9 8.7 - 10.3 mg/dL Final   12/03/2020 9.7 8.6 - 10.0 mg/dL Final     ALT (SGPT)   Date Value Ref Range Status   09/12/2022 18 0 - 32 IU/L Final   12/03/2020 13 5 - 33 U/L  Final     AST (SGOT)   Date Value Ref Range Status   09/12/2022 21 0 - 40 IU/L Final   12/03/2020 18 5 - 32 U/L Final     WBC   Date Value Ref Range Status   03/10/2022 4.8 3.4 - 10.8 x10E3/uL Final   12/03/2020 6.9 4.8 - 10.8 K/uL Final     Hematocrit   Date Value Ref Range Status   03/10/2022 44.7 34.0 - 46.6 % Final   12/03/2020 43.5 37.0 - 47.0 % Final     Platelets   Date Value Ref Range Status   03/10/2022 262 150 - 450 x10E3/uL Final   12/03/2020 264 130 - 400 K/uL Final   06/12/2019 303 150 - 450 10*3/uL Final     Triglycerides   Date Value Ref Range Status   09/12/2022 141 0 - 149 mg/dL Final     HDL Cholesterol   Date Value Ref Range Status   09/12/2022 48 >39 mg/dL Final     LDL Chol Calc (NIH)   Date Value Ref Range Status   09/12/2022 103 (H) 0 - 99 mg/dL Final     Laboratory studies performed on 03/10/2022:  LDL cholesterol is 124 mg/dL.  Total cholesterol is 198 mg/dL.   Alkaline phosphatase is 147 IU/L.  Glucose is 101 mg/dL.    Laboratory studies performed on 09/12/2022:  HDL cholesterol is 48 mg/dL.   LDL cholesterol is 103 mg/dL.  Triglycerides are 141 mg/dL.  Total cholesterol is 176 mg/dL.  CMP is within normal limits. Her Albumin is slightly high at 4.9 g/dL, however the patient attributes this to being mildly dehydrated when labs were drawn.   Alkaline phosphatase is 148 IU/L.  AST and ALT are within normal limits.   Calcium is within normal limits.   Glucose is 94 mg/dL.     Assessment / Plan     Assessment/Plan:  1. Hyperlipidemia, unspecified hyperlipidemia type  LDL cholesterol and glucose levels continue to improve with healthy diet and she continues to lose weight.   She is being followed by her oncologist at [facility] for elevated alkaline phosphatase levels.   She will follow up with Dr. Tom in 06/2023 for repeat endoscopy.   Continue with healthy diet.    2. Primary hypertension  Blood pressure is within normal limits today.   Continue with current diet and medications.      She will follow up in 6 months. If any problems arise before then, she knows to call me.     No follow-ups on file. unless patient needs to be seen sooner or acute issues arise.      I have discussed the patient results/orders and and plan/recommendation with them at today's visit.      Transcribed from ambient dictation for RAYRAY Velázquez by FLOWER IRBY.  09/15/22   10:28 CDT    Patient verbalized consent to the visit recording.        Answers for HPI/ROS submitted by the patient on 9/14/2022  What is the primary reason for your visit?: High Blood Pressure

## 2022-10-03 RX ORDER — HYDROCHLOROTHIAZIDE 12.5 MG/1
12.5 TABLET ORAL DAILY
Qty: 90 TABLET | Refills: 1 | Status: SHIPPED | OUTPATIENT
Start: 2022-10-03 | End: 2023-01-16 | Stop reason: SDUPTHER

## 2022-10-28 RX ORDER — POTASSIUM CHLORIDE 20 MEQ/1
20 TABLET, EXTENDED RELEASE ORAL DAILY
Qty: 90 TABLET | Refills: 2 | Status: SHIPPED | OUTPATIENT
Start: 2022-10-28

## 2022-11-14 ENCOUNTER — TELEPHONE (OUTPATIENT)
Dept: INTERNAL MEDICINE | Facility: CLINIC | Age: 62
End: 2022-11-14

## 2022-11-15 ENCOUNTER — FLU SHOT (OUTPATIENT)
Dept: INTERNAL MEDICINE | Facility: CLINIC | Age: 62
End: 2022-11-15

## 2022-11-15 DIAGNOSIS — Z23 ENCOUNTER FOR IMMUNIZATION: Primary | ICD-10-CM

## 2022-11-15 DIAGNOSIS — C50.919 METASTATIC BREAST CANCER: ICD-10-CM

## 2022-11-15 DIAGNOSIS — R74.8 ELEVATED ALKALINE PHOSPHATASE LEVEL: Primary | ICD-10-CM

## 2022-11-15 PROCEDURE — 90686 IIV4 VACC NO PRSV 0.5 ML IM: CPT | Performed by: NURSE PRACTITIONER

## 2022-11-15 PROCEDURE — 90471 IMMUNIZATION ADMIN: CPT | Performed by: NURSE PRACTITIONER

## 2022-11-15 NOTE — PROGRESS NOTES
Vaccine Administration     Telma Herrera presented to the office for vaccine administration. Discussed risks/benefits to vaccination, reviewed components of the vaccine, discussed VIS, discussed informed consent, informed consent obtained. Patient/Parent was allowed to accept or refuse vaccine. Questions answered to satisfactory state of patient/parent. We reviewed typical age appropriate and seasonally appropriate vaccinations. Reviewed immunization history and updated state vaccination form as needed. Patient was counseled on Influenza.     Vaccine(s) Administered: Influenza  Vaccine administered by: Deedee Lopez RN.   Injection Site: Intramuscular  Supplied: Clinic Supplied    Vaccine administration was Well tolerated by patient.. Patient was briefly monitored for reaction and was discharged.

## 2022-12-06 ENCOUNTER — HOSPITAL ENCOUNTER (OUTPATIENT)
Dept: NUCLEAR MEDICINE | Facility: HOSPITAL | Age: 62
Discharge: HOME | End: 2022-12-06

## 2022-12-06 DIAGNOSIS — C50.919 METASTATIC BREAST CANCER: ICD-10-CM

## 2022-12-06 PROCEDURE — A9503 TC99M MEDRONATE: HCPCS | Performed by: NURSE PRACTITIONER

## 2022-12-06 PROCEDURE — 78306 BONE IMAGING WHOLE BODY: CPT

## 2022-12-06 PROCEDURE — 0 TECHNETIUM MEDRONATE KIT: Performed by: NURSE PRACTITIONER

## 2022-12-06 RX ORDER — TC 99M MEDRONATE 20 MG/10ML
22.8 INJECTION, POWDER, LYOPHILIZED, FOR SOLUTION INTRAVENOUS
Status: COMPLETED | OUTPATIENT
Start: 2022-12-06 | End: 2022-12-06

## 2022-12-06 RX ADMIN — TECHNETIUM TC 99M MEDRONATE 22.8 MILLICURIE: 25 INJECTION, POWDER, FOR SOLUTION INTRAVENOUS at 13:00

## 2022-12-07 ENCOUNTER — APPOINTMENT (OUTPATIENT)
Dept: NUCLEAR MEDICINE | Facility: HOSPITAL | Age: 62
End: 2022-12-07

## 2023-01-14 DIAGNOSIS — I10 PRIMARY HYPERTENSION: ICD-10-CM

## 2023-01-16 RX ORDER — HYDROCHLOROTHIAZIDE 12.5 MG/1
TABLET ORAL
Qty: 90 TABLET | Refills: 1 | Status: SHIPPED | OUTPATIENT
Start: 2023-01-16

## 2023-01-16 RX ORDER — AMLODIPINE BESYLATE 5 MG/1
TABLET ORAL
Qty: 90 TABLET | Refills: 1 | Status: SHIPPED | OUTPATIENT
Start: 2023-01-16

## 2023-01-16 NOTE — TELEPHONE ENCOUNTER
Rx Refill Note  Requested Prescriptions     Pending Prescriptions Disp Refills   • amLODIPine (NORVASC) 5 MG tablet [Pharmacy Med Name: AMLODIPINE BESYLATE 5 MG TAB] 90 tablet 1     Sig: TAKE 1 TABLET BY MOUTH EVERY DAY   • hydroCHLOROthiazide (HYDRODIURIL) 12.5 MG tablet [Pharmacy Med Name: HYDROCHLOROTHIAZIDE 12.5 MG TB] 90 tablet 1     Sig: TAKE 1 TABLET BY MOUTH EVERY DAY      Last office visit with prescribing clinician: 9/15/2022   Next office visit with prescribing clinician: 3/16/2023                         Would you like a call back once the refill request has been completed: [] Yes [] No    If the office needs to give you a call back, can they leave a voicemail: [] Yes [] No    Deedee Lopez RN  01/16/23, 07:11 CST

## 2023-01-19 ENCOUNTER — TELEPHONE (OUTPATIENT)
Dept: INTERNAL MEDICINE | Facility: CLINIC | Age: 63
End: 2023-01-19
Payer: COMMERCIAL

## 2023-01-19 DIAGNOSIS — Z12.31 ENCOUNTER FOR SCREENING MAMMOGRAM FOR MALIGNANT NEOPLASM OF BREAST: Primary | ICD-10-CM

## 2023-01-19 NOTE — TELEPHONE ENCOUNTER
Caller: Telma Herrera    Relationship: Self    Best call back number: [phone]    What orders are you requesting (i.e. lab or imaging):   MAMMOGRAM- BREAST CANCER    In what timeframe would the patient need to come in:   ASAP    Where will you receive your lab/imaging services:   [facility]    Additional notes: PLEASE CONTACT PATIENT AND ADVISE ONCE IT IS COMPLETED.

## 2023-01-31 ENCOUNTER — OFFICE VISIT (OUTPATIENT)
Dept: OBSTETRICS AND GYNECOLOGY | Facility: CLINIC | Age: 63
End: 2023-01-31
Payer: COMMERCIAL

## 2023-01-31 VITALS
WEIGHT: 192 LBS | DIASTOLIC BLOOD PRESSURE: 82 MMHG | BODY MASS INDEX: 32.78 KG/M2 | HEIGHT: 64 IN | SYSTOLIC BLOOD PRESSURE: 122 MMHG

## 2023-01-31 DIAGNOSIS — N39.3 SUI (STRESS URINARY INCONTINENCE, FEMALE): ICD-10-CM

## 2023-01-31 DIAGNOSIS — C50.512 MALIGNANT NEOPLASM OF LOWER-OUTER QUADRANT OF LEFT BREAST OF FEMALE, ESTROGEN RECEPTOR POSITIVE: ICD-10-CM

## 2023-01-31 DIAGNOSIS — Z13.820 ENCOUNTER FOR SCREENING FOR OSTEOPOROSIS: ICD-10-CM

## 2023-01-31 DIAGNOSIS — N81.10 CYSTOCELE WITH RECTOCELE: ICD-10-CM

## 2023-01-31 DIAGNOSIS — N89.8 VAGINAL DRYNESS: ICD-10-CM

## 2023-01-31 DIAGNOSIS — Z17.0 MALIGNANT NEOPLASM OF LOWER-OUTER QUADRANT OF LEFT BREAST OF FEMALE, ESTROGEN RECEPTOR POSITIVE: ICD-10-CM

## 2023-01-31 DIAGNOSIS — Z12.31 ENCOUNTER FOR SCREENING MAMMOGRAM FOR BREAST CANCER: ICD-10-CM

## 2023-01-31 DIAGNOSIS — N81.6 CYSTOCELE WITH RECTOCELE: ICD-10-CM

## 2023-01-31 DIAGNOSIS — Z01.419 WELL WOMAN EXAM WITH ROUTINE GYNECOLOGICAL EXAM: Primary | ICD-10-CM

## 2023-01-31 PROCEDURE — G0123 SCREEN CERV/VAG THIN LAYER: HCPCS | Performed by: NURSE PRACTITIONER

## 2023-01-31 PROCEDURE — 87624 HPV HI-RISK TYP POOLED RSLT: CPT | Performed by: NURSE PRACTITIONER

## 2023-01-31 PROCEDURE — 99213 OFFICE O/P EST LOW 20 MIN: CPT | Performed by: NURSE PRACTITIONER

## 2023-01-31 PROCEDURE — 99396 PREV VISIT EST AGE 40-64: CPT | Performed by: NURSE PRACTITIONER

## 2023-01-31 RX ORDER — LEVOCETIRIZINE DIHYDROCHLORIDE 5 MG/1
2.5 TABLET, FILM COATED ORAL EVERY EVENING
COMMUNITY

## 2023-01-31 NOTE — PROGRESS NOTES
"Subjective     Telma Herrera is a 63 y.o. female    History of Present Illness  Patient is here today as a new patient.  She has had breast cancer approximately 4 years ago.  She is currently on Arimidex and is having some hot flashes from it.  She is also noted vaginal dryness.  She denies any spotting or bleeding.  Gynecologic Exam  The patient's pertinent negatives include no pelvic pain, vaginal bleeding or vaginal discharge. The patient is experiencing no pain. Associated symptoms include painful intercourse. Pertinent negatives include no abdominal pain, anorexia, back pain, chills, constipation, diarrhea, discolored urine, dysuria, fever, flank pain, frequency, headaches, hematuria, joint pain, joint swelling, nausea, rash, sore throat, urgency or vomiting. The symptoms are aggravated by intercourse. She has tried nothing for the symptoms. She is sexually active. She is postmenopausal.         /82   Ht 162.6 cm (64.02\")   Wt 87.1 kg (192 lb)   BMI 32.94 kg/m²     Outpatient Encounter Medications as of 1/31/2023   Medication Sig Dispense Refill   • amLODIPine (NORVASC) 5 MG tablet TAKE 1 TABLET BY MOUTH EVERY DAY 90 tablet 1   • anastrozole (ARIMIDEX) 1 MG tablet Take  by mouth Daily.     • Biotin 5 MG tablet Take 3 capsules by mouth.     • hydroCHLOROthiazide (HYDRODIURIL) 12.5 MG tablet TAKE 1 TABLET BY MOUTH EVERY DAY 90 tablet 1   • levocetirizine (XYZAL) 5 MG tablet Take 2.5 mg by mouth Every Evening.     • Multiple Vitamins-Calcium (ONE-A-DAY WOMENS FORMULA PO) One-A-Day Womens Formula     • pantoprazole (PROTONIX) 40 MG EC tablet Take 1 tablet by mouth Daily. 30 tablet 11   • potassium chloride (K-DUR,KLOR-CON) 20 MEQ CR tablet Take 1 tablet by mouth Daily. 90 tablet 2   • psyllium (METAMUCIL SMOOTH TEXTURE) 28 % packet Take  by mouth.     • [DISCONTINUED] hydroCHLOROthiazide (MICROZIDE) 12.5 MG capsule Take 12.5 mg by mouth.     • [DISCONTINUED] Probiotic Product (PROBIOTIC-10 PO) Probiotic   "     No facility-administered encounter medications on file as of 1/31/2023.       Surgical History  Past Surgical History:   Procedure Laterality Date   • BREAST LUMPECTOMY     • COLONOSCOPY      2011?   • COLONOSCOPY N/A 5/17/2022    Procedure: COLONOSCOPY WITH ANESTHESIA;  Surgeon: Saeed Tom MD;  Location: [facility] ENDOSCOPY;  Service: Gastroenterology;  Laterality: N/A;  preop; screening   postop; diverticulosis ; hemmhroids   PCP Shanelle Foster   • DILATION AND CURETTAGE, DIAGNOSTIC / THERAPEUTIC     • ENDOSCOPY N/A 5/17/2022    Procedure: ESOPHAGOGASTRODUODENOSCOPY WITH ANESTHESIA;  Surgeon: Saeed Tom MD;  Location: [facility] ENDOSCOPY;  Service: Gastroenterology;  Laterality: N/A;  preop; gerd  postop; esophagitis   PCP Shanelle Foster   • ENDOSCOPY N/A 6/3/2022    Procedure: ESOPHAGOGASTRODUODENOSCOPY;  Surgeon: Saeed Tom MD;  Location: [facility] ENDOSCOPY;  Service: Gastroenterology;  Laterality: N/A;  preop; GERD  postop hiatal hernia  PCP Thelma Foster    • LIPOMA RESECTION     • OTHER SURGICAL HISTORY      rectocele   • RECTOCELE REPAIR     • WISDOM TOOTH EXTRACTION         Family History  Family History   Problem Relation Age of Onset   • Leukemia Father    • Lymphoma Father    • Hypertension Mother    • Diabetes Mother    • Stroke Mother    • High cholesterol Brother    • Hypertension Brother    • Diabetes Brother    • Colon cancer Neg Hx    • Colon polyps Neg Hx    • Ovarian cancer Neg Hx    • Breast cancer Neg Hx    • Uterine cancer Neg Hx    • Melanoma Neg Hx    • Prostate cancer Neg Hx        The following portions of the patient's history were reviewed and updated as appropriate: allergies, current medications, past family history, past medical history, past social history, past surgical history, and problem list.    Review of Systems   Constitutional: Negative for activity change, appetite change, chills, diaphoresis, fatigue, fever, unexpected weight gain and unexpected weight  loss.   HENT: Negative for congestion, dental problem, drooling, ear discharge, ear pain, facial swelling, hearing loss, mouth sores, nosebleeds, postnasal drip, rhinorrhea, sinus pressure, sneezing, sore throat, swollen glands, tinnitus, trouble swallowing and voice change.    Eyes: Negative for blurred vision, double vision, photophobia, pain, discharge, redness, itching and visual disturbance.   Respiratory: Negative for apnea, cough, choking, chest tightness, shortness of breath, wheezing and stridor.    Cardiovascular: Negative for chest pain, palpitations and leg swelling.   Gastrointestinal: Negative for abdominal distention, abdominal pain, anal bleeding, anorexia, blood in stool, constipation, diarrhea, nausea, rectal pain, vomiting, GERD and indigestion.   Endocrine: Negative for cold intolerance, heat intolerance, polydipsia, polyphagia and polyuria.   Genitourinary: Positive for decreased libido and dyspareunia. Negative for amenorrhea, breast discharge, breast lump, breast pain, decreased urine volume, difficulty urinating, dysuria, flank pain, frequency, genital sores, hematuria, menstrual problem, pelvic pain, pelvic pressure, urgency, urinary incontinence, vaginal bleeding, vaginal discharge and vaginal pain.   Musculoskeletal: Negative for arthralgias, back pain, gait problem, joint pain, joint swelling, myalgias, neck pain, neck stiffness and bursitis.   Skin: Negative for color change, dry skin and rash.   Allergic/Immunologic: Negative for environmental allergies, food allergies and immunocompromised state.   Neurological: Negative for dizziness, tremors, seizures, syncope, facial asymmetry, speech difficulty, weakness, light-headedness, numbness, headache, memory problem and confusion.   Hematological: Negative for adenopathy. Does not bruise/bleed easily.   Psychiatric/Behavioral: Negative for agitation, behavioral problems, decreased concentration, dysphoric mood, hallucinations, self-injury,  sleep disturbance, suicidal ideas, negative for hyperactivity, depressed mood and stress. The patient is not nervous/anxious.        Objective   Physical Exam  Vitals and nursing note reviewed. Exam conducted with a chaperone present.   Constitutional:       General: She is not in acute distress.     Appearance: She is well-developed. She is not diaphoretic.   HENT:      Head: Normocephalic.      Right Ear: External ear normal.      Left Ear: External ear normal.      Nose: Nose normal.   Eyes:      General: No scleral icterus.        Right eye: No discharge.         Left eye: No discharge.      Conjunctiva/sclera: Conjunctivae normal.      Pupils: Pupils are equal, round, and reactive to light.   Neck:      Thyroid: No thyromegaly.      Vascular: No carotid bruit.      Trachea: No tracheal deviation.   Cardiovascular:      Rate and Rhythm: Normal rate and regular rhythm.      Heart sounds: Normal heart sounds. No murmur heard.  Pulmonary:      Effort: Pulmonary effort is normal. No respiratory distress.      Breath sounds: Normal breath sounds. No wheezing.   Chest:   Breasts:     Breasts are symmetrical.      Right: Normal. No swelling, bleeding, inverted nipple, mass, nipple discharge, skin change or tenderness.      Left: Normal. No swelling, bleeding, inverted nipple, mass, nipple discharge, skin change or tenderness.   Abdominal:      General: There is no distension.      Palpations: Abdomen is soft. There is no mass.      Tenderness: There is no abdominal tenderness. There is no right CVA tenderness, left CVA tenderness or guarding.      Hernia: No hernia is present. There is no hernia in the left inguinal area or right inguinal area.   Genitourinary:     General: Normal vulva.      Exam position: Lithotomy position.      Labia:         Right: No rash, tenderness, lesion or injury.         Left: No rash, tenderness, lesion or injury.       Vagina: Normal. No signs of injury and foreign body. No vaginal  discharge, erythema, tenderness or bleeding.      Cervix: Normal.      Uterus: Normal. Not enlarged, not fixed and not tender.       Adnexa: Right adnexa normal and left adnexa normal.        Right: No mass, tenderness or fullness.          Left: No mass, tenderness or fullness.        Rectum: Normal. No mass.      Comments: Mild cystocele and third-degree rectocele was noted.  BSU normal  Urethral meatus  Normal  Perineum  Normal  Musculoskeletal:         General: No tenderness. Normal range of motion.      Cervical back: Normal range of motion and neck supple.   Lymphadenopathy:      Head:      Right side of head: No submental, submandibular, tonsillar, preauricular, posterior auricular or occipital adenopathy.      Left side of head: No submental, submandibular, tonsillar, preauricular, posterior auricular or occipital adenopathy.      Cervical: No cervical adenopathy.      Right cervical: No superficial, deep or posterior cervical adenopathy.     Left cervical: No superficial, deep or posterior cervical adenopathy.      Upper Body:      Right upper body: No supraclavicular, axillary or pectoral adenopathy.      Left upper body: No supraclavicular, axillary or pectoral adenopathy.      Lower Body: No right inguinal adenopathy. No left inguinal adenopathy.   Skin:     General: Skin is warm and dry.      Findings: No bruising, erythema or rash.   Neurological:      Mental Status: She is alert and oriented to person, place, and time.      Coordination: Coordination normal.   Psychiatric:         Mood and Affect: Mood normal.         Behavior: Behavior normal.         Thought Content: Thought content normal.         Judgment: Judgment normal.         Assessment & Plan   Diagnoses and all orders for this visit:    1. Well woman exam with routine gynecological exam (Primary)  Normal GYN exam. Will have lab work at PCP. Encouraged SBE, pt is aware how to do self breast exam and the importance of same. Discussed weight  management and importance of maintaining a healthy weight. Discussed Vitamin D intake and the importance of adequate vitamin D for both Bone Health and a healthy immune system.  Discussed Daily exercise and the importance of same, in regards to a healthy heart as well as helping to maintain her weight and improving her mental health.  BMI 32.9.  Colonoscopy is up to date.  Mammogram and bone density will be scheduled at [facility].  Pap smear is done per ASCCP guidelines.  -     Liquid-based Pap Smear, Screening    2. Encounter for screening mammogram for breast cancer  Comments:  Patient already has her mammogram scheduled.    3. Encounter for screening for osteoporosis  Comments:  Patient is due for bone density in the fall.  Orders placed and she will have that scheduled.  Orders:  -     DEXA Bone Density Axial; Future    4. Malignant neoplasm of lower-outer quadrant of left breast of female, estrogen receptor positive (HCC)  Comments:  Patient is followed by a doctor in [facility].  She is 4 years post cancer.    5. Vaginal dryness  Comments:  Patient is having vaginal dryness.  She is encouraged to use coconut oil daily.    6. Cystocele with rectocele  Comments:  Patient has a mild cystocele and rectocele.  She has noted some urinary incontinence.  She is referred for pelvic floor physical therapy.  Orders:  -     Ambulatory Referral to Physical Therapy Pelvic Floor    7. MOHAN (stress urinary incontinence, female)  Comments:  Patient states that her MOHAN is only mild at this time.         BMI is >= 30 and <35. (Class 1 Obesity). The following options were offered after discussion;: weight loss educational material (shared in after visit summary), exercise counseling/recommendations and nutrition counseling/recommendations      Parul Gabriel, APRN  1/31/2023

## 2023-02-03 LAB
GEN CATEG CVX/VAG CYTO-IMP: NORMAL
HPV I/H RISK 4 DNA CVX QL PROBE+SIG AMP: NOT DETECTED
LAB AP CASE REPORT: NORMAL
LAB AP GYN ADDITIONAL INFORMATION: NORMAL
LAB AP GYN OTHER FINDINGS: NORMAL
Lab: NORMAL
PATH INTERP SPEC-IMP: NORMAL
STAT OF ADQ CVX/VAG CYTO-IMP: NORMAL

## 2023-02-14 ENCOUNTER — APPOINTMENT (OUTPATIENT)
Dept: OTHER | Facility: HOSPITAL | Age: 63
End: 2023-02-14
Payer: COMMERCIAL

## 2023-02-14 ENCOUNTER — HOSPITAL ENCOUNTER (OUTPATIENT)
Dept: MAMMOGRAPHY | Facility: HOSPITAL | Age: 63
Discharge: HOME | End: 2023-02-14
Payer: COMMERCIAL

## 2023-02-14 DIAGNOSIS — Z00.6 ENCOUNTER FOR EXAMINATION FOR NORMAL COMPARISON AND CONTROL IN CLINICAL RESEARCH PROGRAM: ICD-10-CM

## 2023-02-14 DIAGNOSIS — Z85.3 HX: BREAST CANCER: ICD-10-CM

## 2023-02-14 PROCEDURE — G0279 TOMOSYNTHESIS, MAMMO: HCPCS

## 2023-02-14 PROCEDURE — 77066 DX MAMMO INCL CAD BI: CPT

## 2023-03-09 ENCOUNTER — TELEPHONE (OUTPATIENT)
Dept: INTERNAL MEDICINE | Facility: CLINIC | Age: 63
End: 2023-03-09
Payer: COMMERCIAL

## 2023-03-09 NOTE — TELEPHONE ENCOUNTER
Pt is coming in April for fasting labs and I don't see orders.  She stated Shanelle wanted her to have labs

## 2023-03-10 DIAGNOSIS — Z00.00 ROUTINE GENERAL MEDICAL EXAMINATION AT A HEALTH CARE FACILITY: Primary | ICD-10-CM

## 2023-03-10 DIAGNOSIS — Z11.59 ENCOUNTER FOR HEPATITIS C SCREENING TEST FOR LOW RISK PATIENT: ICD-10-CM

## 2023-04-03 RX ORDER — FLUTICASONE PROPIONATE 50 MCG
SPRAY, SUSPENSION (ML) NASAL
Qty: 1 BOTTLE | Refills: 1 | OUTPATIENT
Start: 2023-04-03

## 2023-04-10 ENCOUNTER — TREATMENT (OUTPATIENT)
Dept: PHYSICAL THERAPY | Facility: CLINIC | Age: 63
End: 2023-04-10
Payer: COMMERCIAL

## 2023-04-10 DIAGNOSIS — R53.1 WEAKNESS: ICD-10-CM

## 2023-04-10 DIAGNOSIS — N81.6 CYSTOCELE WITH RECTOCELE: Primary | ICD-10-CM

## 2023-04-10 DIAGNOSIS — N81.10 CYSTOCELE WITH RECTOCELE: Primary | ICD-10-CM

## 2023-04-10 PROCEDURE — 97162 PT EVAL MOD COMPLEX 30 MIN: CPT | Performed by: PHYSICAL THERAPIST

## 2023-04-10 PROCEDURE — 97110 THERAPEUTIC EXERCISES: CPT | Performed by: PHYSICAL THERAPIST

## 2023-04-10 PROCEDURE — 97535 SELF CARE MNGMENT TRAINING: CPT | Performed by: PHYSICAL THERAPIST

## 2023-04-10 NOTE — PROGRESS NOTES
Physical Therapy Initial Evaluation and Plan of Care  [address] [place] [address]    Patient: Telma Herrera              : 1960  Today's Date: 4/10/2023  Referring practitioner: RAYRAY Dudley  Date of Initial Visit: 4/10/2023  Patient seen for 1 sessions    Visit Diagnoses:    ICD-10-CM ICD-9-CM   1. Cystocele with rectocele  N81.10 618.01    N81.6 618.04   2. Mixed stress and urge urinary incontinence  N39.46 788.33     Past Medical History:   Diagnosis Date   • Arthritis    • Jean-Baptiste's esophagus    • Breast cancer 2019    left breast   • Drug therapy    • Fatty liver disease, nonalcoholic    • GERD (gastroesophageal reflux disease)    • Hiatal hernia    • Hx of radiation therapy 2019    left breast   • Hypertension    • Hypokalemia    • Idiopathic hematuria    • Kidney stones    • Lipoma    • Osteopenia    • Renal disorder    • UTI (urinary tract infection)      Past Surgical History:   Procedure Laterality Date   • BREAST BIOPSY Left     cancer   • BREAST LUMPECTOMY Left    • COLONOSCOPY      ?   • COLONOSCOPY N/A 2022    Procedure: COLONOSCOPY WITH ANESTHESIA;  Surgeon: Saeed Tom MD;  Location: [facility] ENDOSCOPY;  Service: Gastroenterology;  Laterality: N/A;  preop; screening   postop; diverticulosis ; hemmhroids   PCP Shanelle Foster   • DILATION AND CURETTAGE, DIAGNOSTIC / THERAPEUTIC     • ENDOSCOPY N/A 2022    Procedure: ESOPHAGOGASTRODUODENOSCOPY WITH ANESTHESIA;  Surgeon: Saeed Tom MD;  Location: [facility] ENDOSCOPY;  Service: Gastroenterology;  Laterality: N/A;  preop; gerd  postop; esophagitis   PCP Shanelle Foster   • ENDOSCOPY N/A 2022    Procedure: ESOPHAGOGASTRODUODENOSCOPY;  Surgeon: Saeed Tom MD;  Location: [facility] ENDOSCOPY;  Service: Gastroenterology;  Laterality: N/A;  preop; GERD  postop hiatal hernia  PCP Thelma Foster    • LIPOMA RESECTION Left     axilla area   • OTHER  SURGICAL HISTORY      rectocele   • RECTOCELE REPAIR     • WISDOM TOOTH EXTRACTION         SUBJECTIVE     Subjective She had rectocele in 2008/2009, helped, now back to where it was. She has pelvic pressure. She does have aching all over her body, her cancer medication does make her sore. She has been retired for 2 years and has been more sedentary, but trying to become more active. She does have bulge prior BM, but nothing like prior to repair. She does feel like she has to brace prior to cough/sneeze, but does not leak. Her urinary urgency has increased and feels less in control.   She did have episiotomy (8# and almost 10#) with severe tearing. She's had bad hemorrhoids.   She has a lot of vaginal dryness she is using coconut oil.     She also has an L abdominal hernia that occassionally bothers her.   She does drink a lot of water and has frequent urination (about 70 oz of water). Drinks almond milk with a little bit of coffee.        Outcome Measure:   PFDI-20: 16.75, 12.5, 29.25 (58.5/300)     Subjective      Chief Complaint:   Chief Complaint   Patient presents with   • Initial Evaluation          OBJECTIVE     Objective   Objective          Passive Hip ROM    Hip IR  Hip ER   Right 16 Right 40   Left 20 Left 45     Hip Abduction Strength (Glut Med): R 4- L 4-    SLS R <5 seconds prior to instability L WFL decreased stability noted    Therapeutic Exercises    95418 Units Comments   Pillow prop     breathing     Bridge + exhale               Timed Minutes 10     Therapy Education/Self Care [id number]   Education offered today POC, anatomy   [facility] Code [email or web address]   Ongoing HEP   URL: [email or web address]  Date: 04/10/2023  Prepared by: Bree Goldman    Exercises  - Pillow Prop  - 1-2 x daily  - Hooklying Rib Cage Breathing  - 2-3 x daily - 1-2 sets - 2-3 reps  - Sit to Stand with Armchair   - Beginner Bridge  - 3 x weekly - 2-3 sets - 10 reps   Timed Minutes 15       Total Timed Treatment:     25    mins  Total Time of Visit:            50   mins    ASSESSMENT/PLAN      Goals                                                                           Progress Note due by 5/9/23                                                                                         Re-cert due by 7/8/23               STG by: 6 weeks Comments Status   Pt will demonstrate proper PF contraction/relaxation and TA activation in supine in order to progress with more functional strengthening.      Pt will be independent with initial HEP, in order to accelerate progress.                     LTG by: 12 weeks      Pt will be independent with HEP including core, hip and PF strengthening.       Pt will demonstrate 4+/5 or greater hip abduction strength.        Pt will score 25 or less on the PFDI-20.   16.75, 12.5, 29.25 (58.5/300)    Pt will be able to have BM without an increase in pelvic pressure or bulge.                          Assessment & Plan     Assessment  Impairments: abnormal or restricted ROM, activity intolerance, impaired balance, impaired physical strength, lacks appropriate home exercise program and pain with function  Functional Limitations: carrying objects, lifting, walking, pulling, pushing, uncomfortable because of pain, sitting, standing, stooping and unable to perform repetitive tasks  Assessment details: Telma presents with symptoms of POP, including pelvic pressure. The pressure does worsen when she needs to have a BM. She does not UI, but feels like she has to brace with increased IAP to prevent leaking and feels overall less control of her bladder. She has difficulty with prolonged positions, including standing, lifting, pushing and pulling. Some of these are limited by her current cancer medication she is on, but she does have increased symptoms with activities. She has limited rib expansion and breathing mechanics. She has decreased hip ROM, core and hip strength. Skilled PT needed to address these deficits and  progress towards independent HEP.   Prognosis: good    Plan  Therapy options: will be seen for skilled therapy services  Other planned modality interventions: no modalities, currently undergoing chemo medication for prevention  Planned therapy interventions: abdominal trunk stabilization, ADL retraining, balance/weight-bearing training, body mechanics training, functional ROM exercises, gait training, home exercise program, IADL retraining, joint mobilization, transfer training, therapeutic activities, stretching, spinal/joint mobilization, strengthening, postural training, neuromuscular re-education, manual therapy and soft tissue mobilization  Frequency: 1x week  Duration in weeks: 12  Treatment plan discussed with: patient  Plan details: Initially we will work on breathing mechanics, rib expansion and transitional movements, including lifting mechanics and decreasing IAP. We will work on hip ROM and surrounding soft tissue restrictions. We will progress with pelvic floor coordination, hip and core strengthening. We will update HEP as appropriate.       SIGNATURE: Bree Goldman PT DPT, [place] License #: [id number]  Electronically Signed on 4/10/2023    Initial Certification  Certification Period: 4/10/2023 through 7/8/2023  I certify that the therapy services are furnished while this patient is under my care.  The services outlined above are required by this patient, and will be reviewed every 90 days.     PHYSICIAN: Parul Gabriel APRN (NPI: [id number])    Signature:________________________________________DATE: _________    Please sign and return via fax to [phone].   Thank you so much for letting us work with Telma. I appreciate your letting us work with your patients. If you have any questions or concerns, please don't hesitate to contact me.          [address] [id number]  [phone]

## 2023-04-13 ENCOUNTER — LAB (OUTPATIENT)
Dept: INTERNAL MEDICINE | Facility: CLINIC | Age: 63
End: 2023-04-13
Payer: COMMERCIAL

## 2023-04-17 ENCOUNTER — OFFICE VISIT (OUTPATIENT)
Dept: INTERNAL MEDICINE | Facility: CLINIC | Age: 63
End: 2023-04-17
Payer: COMMERCIAL

## 2023-04-17 VITALS
TEMPERATURE: 97.6 F | HEART RATE: 69 BPM | DIASTOLIC BLOOD PRESSURE: 80 MMHG | BODY MASS INDEX: 31.41 KG/M2 | SYSTOLIC BLOOD PRESSURE: 120 MMHG | WEIGHT: 184 LBS | HEIGHT: 64 IN | RESPIRATION RATE: 18 BRPM | OXYGEN SATURATION: 98 %

## 2023-04-17 DIAGNOSIS — Z00.00 ROUTINE GENERAL MEDICAL EXAMINATION AT A HEALTH CARE FACILITY: Primary | ICD-10-CM

## 2023-04-17 DIAGNOSIS — Z23 ENCOUNTER FOR IMMUNIZATION: ICD-10-CM

## 2023-04-17 PROBLEM — R87.619 ABNORMAL CERVICAL PAPANICOLAOU SMEAR: Status: ACTIVE | Noted: 2022-07-18

## 2023-04-17 RX ORDER — AMLODIPINE BESYLATE 5 MG/1
5 TABLET ORAL DAILY
COMMUNITY

## 2023-04-17 NOTE — PROGRESS NOTES
Subjective     Chief Complaint   Patient presents with   • Annual Exam       History of Present Illness  Pt comes in today for her annual exam. She is overall doing well. She has lost weight. She had labs prior to office visit. She has continued to lose weight. She did a period of eating poorly but she is doing much better. She has started the apple cider vinegar with mother.     Review of Systems   Otherwise complete ROS reviewed and negative except as mentioned in the HPI.    Past Medical History:   Past Medical History:   Diagnosis Date   • Arthritis    • Jean-Baptiste's esophagus    • Breast cancer 2019    left breast   • Drug therapy    • Fatty liver disease, nonalcoholic    • GERD (gastroesophageal reflux disease)    • Hiatal hernia    • Hx of radiation therapy 2019    left breast   • Hypertension    • Hypokalemia    • Idiopathic hematuria    • Kidney stones    • Lipoma    • Osteopenia    • Renal disorder    • UTI (urinary tract infection)      Past Surgical History:  Past Surgical History:   Procedure Laterality Date   • BREAST BIOPSY Left 2019    cancer   • BREAST LUMPECTOMY Left 2019   • COLONOSCOPY      2011?   • COLONOSCOPY N/A 05/17/2022    Procedure: COLONOSCOPY WITH ANESTHESIA;  Surgeon: Saeed Tom MD;  Location: [facility] ENDOSCOPY;  Service: Gastroenterology;  Laterality: N/A;  preop; screening   postop; diverticulosis ; hemmhroids   PCP Shanelle Foster   • DILATION AND CURETTAGE, DIAGNOSTIC / THERAPEUTIC     • ENDOSCOPY N/A 05/17/2022    Procedure: ESOPHAGOGASTRODUODENOSCOPY WITH ANESTHESIA;  Surgeon: Saeed Tom MD;  Location: [facility] ENDOSCOPY;  Service: Gastroenterology;  Laterality: N/A;  preop; gerd  postop; esophagitis   PCP Shanelle Foster   • ENDOSCOPY N/A 06/03/2022    Procedure: ESOPHAGOGASTRODUODENOSCOPY;  Surgeon: Saeed Tom MD;  Location: [facility] ENDOSCOPY;  Service: Gastroenterology;  Laterality: N/A;  preop; GERD  postop hiatal hernia  PCP Thelma Foster    • LIPOMA  RESECTION Left 2007    axilla area   • OTHER SURGICAL HISTORY      rectocele   • RECTOCELE REPAIR     • WISDOM TOOTH EXTRACTION       Social History:  reports that she has never smoked. She has never used smokeless tobacco. She reports current alcohol use. She reports that she does not currently use drugs.    Family History: family history includes Diabetes in her brother and mother; High cholesterol in her brother; Hypertension in her brother and mother; Leukemia in her father; Lymphoma in her father; Stroke in her mother.       Allergies:  Allergies   Allergen Reactions   • Ace Inhibitors Other (See Comments), Swelling and Unknown - High Severity     lisinopril  lisinopril     • Omeprazole Other (See Comments) and Unknown - High Severity     Developed swollen lips with tiny white sores   • Sulfa Antibiotics Swelling   • Levofloxacin Myalgia and Other (See Comments)     Muscle pain       Medications:  Prior to Admission medications    Medication Sig Start Date End Date Taking? Authorizing Provider   amLODIPine (NORVASC) 5 MG tablet TAKE 1 TABLET BY MOUTH EVERY DAY 1/16/23   Thelma Foster APRN   amLODIPine (NORVASC) 5 MG tablet Take 1 tablet by mouth Daily.    ProviderAlbert MD   anastrozole (ARIMIDEX) 1 MG tablet Take  by mouth Daily.    Albert Arzate MD   Biotin 5 MG tablet Take 3 capsules by mouth.    Albert Arzate MD   hydroCHLOROthiazide (HYDRODIURIL) 12.5 MG tablet TAKE 1 TABLET BY MOUTH EVERY DAY 1/16/23   Thelma Foster APRN   levocetirizine (XYZAL) 5 MG tablet Take 2.5 mg by mouth Every Evening.    Albert Arzate MD   Multiple Vitamins-Calcium (ONE-A-DAY WOMENS FORMULA PO) One-A-Day Womens Formula    Albert Arzate MD   pantoprazole (PROTONIX) 40 MG EC tablet Take 1 tablet by mouth Daily. 6/6/22   Saeed Tom MD   potassium chloride (K-DUR,KLOR-CON) 20 MEQ CR tablet Take 1 tablet by mouth Daily. 10/28/22   Thelma Foster APRN  "  psyllium (METAMUCIL SMOOTH TEXTURE) 28 % packet Take  by mouth.    Provider, MD Albert       Objective     Vital Signs: /80   Pulse 69   Temp 97.6 °F (36.4 °C)   Resp 18   Ht 162.6 cm (64.02\")   Wt 83.5 kg (184 lb)   SpO2 98%   BMI 31.56 kg/m²   Physical Exam  Vitals reviewed.   Constitutional:       Appearance: Normal appearance. She is well-developed.   HENT:      Head: Normocephalic and atraumatic.   Eyes:      Pupils: Pupils are equal, round, and reactive to light.   Neck:      Vascular: No JVD.   Cardiovascular:      Rate and Rhythm: Normal rate and regular rhythm.   Pulmonary:      Effort: Pulmonary effort is normal.      Breath sounds: Normal breath sounds.   Abdominal:      General: Bowel sounds are normal.      Palpations: Abdomen is soft.   Musculoskeletal:         General: No deformity.      Cervical back: Normal range of motion and neck supple.   Lymphadenopathy:      Cervical: No cervical adenopathy.   Skin:     General: Skin is warm and dry.   Neurological:      Mental Status: She is alert and oriented to person, place, and time.   Psychiatric:         Behavior: Behavior normal.         Thought Content: Thought content normal.         Judgment: Judgment normal.         BMI is >= 30 and <35. (Class 1 Obesity). The following options were offered after discussion;: exercise counseling/recommendations and nutrition counseling/recommendations      Results Reviewed:  Glucose   Date Value Ref Range Status   04/13/2023 85 70 - 99 mg/dL Final   12/03/2020 91 74 - 109 mg/dL Final     BUN   Date Value Ref Range Status   04/13/2023 12 8 - 27 mg/dL Final   12/03/2020 11 6 - 20 mg/dL Final     Creatinine   Date Value Ref Range Status   04/13/2023 0.92 0.57 - 1.00 mg/dL Final   12/03/2020 0.6 0.5 - 0.9 mg/dL Final     Sodium   Date Value Ref Range Status   04/13/2023 142 134 - 144 mmol/L Final   12/03/2020 140 136 - 145 mmol/L Final     Potassium   Date Value Ref Range Status   04/13/2023 3.8 3.5 " - 5.2 mmol/L Final   12/03/2020 4.0 3.5 - 5.0 mmol/L Final     Chloride   Date Value Ref Range Status   04/13/2023 102 96 - 106 mmol/L Final   12/03/2020 101 98 - 111 mmol/L Final     CO2   Date Value Ref Range Status   12/03/2020 28 22 - 29 mmol/L Final     Total CO2   Date Value Ref Range Status   04/13/2023 28 20 - 29 mmol/L Final     Calcium   Date Value Ref Range Status   04/13/2023 9.6 8.7 - 10.3 mg/dL Final   12/03/2020 9.7 8.6 - 10.0 mg/dL Final     ALT (SGPT)   Date Value Ref Range Status   04/13/2023 17 0 - 32 IU/L Final   12/03/2020 13 5 - 33 U/L Final     AST (SGOT)   Date Value Ref Range Status   04/13/2023 20 0 - 40 IU/L Final   12/03/2020 18 5 - 32 U/L Final     WBC   Date Value Ref Range Status   04/13/2023 5.1 3.4 - 10.8 x10E3/uL Final   12/03/2020 6.9 4.8 - 10.8 K/uL Final     Hematocrit   Date Value Ref Range Status   04/13/2023 43.9 34.0 - 46.6 % Final   12/03/2020 43.5 37.0 - 47.0 % Final     Platelets   Date Value Ref Range Status   04/13/2023 232 150 - 450 x10E3/uL Final   12/03/2020 264 130 - 400 K/uL Final   06/12/2019 303 150 - 450 10*3/uL Final     Triglycerides   Date Value Ref Range Status   04/13/2023 136 0 - 149 mg/dL Final   07/25/2018 63 <=150 mg/dL Final     Comment:     Desirable    <149 mg/dL  Borderline   150-199 mg/dL  Higher Risk  >200 mg/dL     HDL Cholesterol   Date Value Ref Range Status   04/13/2023 50 >39 mg/dL Final   07/25/2018 58 >=40 mg/dL Final     Comment:     Desirable    >39 mg/dL  Higher Risk  <39 mg/dL     LDL Cholesterol    Date Value Ref Range Status   07/25/2018 104 <=130 mg/dL Final     Comment:     Desirable    <100mg/dL  Borderline   100-159 mg/dL  Higher Risk  >160 mg/dL     LDL Chol Calc (NIH)   Date Value Ref Range Status   04/13/2023 103 (H) 0 - 99 mg/dL Final     Hemoglobin A1C   Date Value Ref Range Status   04/13/2023 5.6 4.8 - 5.6 % Final     Comment:              Prediabetes: 5.7 - 6.4           Diabetes: >6.4           Glycemic control for adults  with diabetes: <7.0           Assessment / Plan     Assessment/Plan:  Diagnoses and all orders for this visit:    1. Routine general medical examination at a health care facility (Primary)    2. Encounter for immunization  -     Shingrix Vaccine  -     Tdap Vaccine Greater Than or Equal To 6yo IM    we discussed her labs in detail. She is up to date on her screenings    We discussed exercise and the need for balanced diet and being outside getting natural Vitamin d.     Return in about 6 months (around 10/17/2023). unless patient needs to be seen sooner or acute issues arise.    Code Status: Full.     I have discussed the patient results/orders and and plan/recommendation with them at today's visit.      Thelma Foster, APRN   04/17/2023

## 2023-05-12 ENCOUNTER — TELEPHONE (OUTPATIENT)
Dept: PHYSICAL THERAPY | Facility: OTHER | Age: 63
End: 2023-05-12
Payer: COMMERCIAL

## 2023-05-12 NOTE — TELEPHONE ENCOUNTER
Caller: Telma Herrera    Relationship: Self    What was the call regarding: PATIENT CANCELLED ALL APPTS BECAUSE PATIENT DOESNT HAVE TIME FOR PT

## 2023-07-12 PROBLEM — K22.70 BARRETT'S ESOPHAGUS WITHOUT DYSPLASIA: Status: ACTIVE | Noted: 2023-07-12

## 2023-08-28 ENCOUNTER — ANESTHESIA (OUTPATIENT)
Dept: GASTROENTEROLOGY | Facility: HOSPITAL | Age: 63
End: 2023-08-28
Payer: COMMERCIAL

## 2023-08-28 ENCOUNTER — ANESTHESIA EVENT (OUTPATIENT)
Dept: GASTROENTEROLOGY | Facility: HOSPITAL | Age: 63
End: 2023-08-28
Payer: COMMERCIAL

## 2023-08-28 ENCOUNTER — HOSPITAL ENCOUNTER (OUTPATIENT)
Facility: HOSPITAL | Age: 63
Setting detail: HOSPITAL OUTPATIENT SURGERY
Discharge: HOME | End: 2023-08-28
Attending: INTERNAL MEDICINE | Admitting: INTERNAL MEDICINE
Payer: COMMERCIAL

## 2023-08-28 VITALS
HEART RATE: 81 BPM | WEIGHT: 189 LBS | RESPIRATION RATE: 14 BRPM | BODY MASS INDEX: 32.27 KG/M2 | SYSTOLIC BLOOD PRESSURE: 101 MMHG | TEMPERATURE: 96.9 F | OXYGEN SATURATION: 98 % | HEIGHT: 64 IN | DIASTOLIC BLOOD PRESSURE: 57 MMHG

## 2023-08-28 DIAGNOSIS — K22.70 BARRETT'S ESOPHAGUS WITHOUT DYSPLASIA: ICD-10-CM

## 2023-08-28 PROCEDURE — 88305 TISSUE EXAM BY PATHOLOGIST: CPT | Performed by: INTERNAL MEDICINE

## 2023-08-28 PROCEDURE — 25010000002 PROPOFOL 1000 MG/100ML EMULSION: Performed by: NURSE ANESTHETIST, CERTIFIED REGISTERED

## 2023-08-28 PROCEDURE — 43239 EGD BIOPSY SINGLE/MULTIPLE: CPT | Performed by: INTERNAL MEDICINE

## 2023-08-28 RX ORDER — PROPOFOL 10 MG/ML
INJECTION, EMULSION INTRAVENOUS AS NEEDED
Status: DISCONTINUED | OUTPATIENT
Start: 2023-08-28 | End: 2023-08-28 | Stop reason: SURG

## 2023-08-28 RX ORDER — LIDOCAINE HYDROCHLORIDE 20 MG/ML
INJECTION, SOLUTION EPIDURAL; INFILTRATION; INTRACAUDAL; PERINEURAL AS NEEDED
Status: DISCONTINUED | OUTPATIENT
Start: 2023-08-28 | End: 2023-08-28 | Stop reason: SURG

## 2023-08-28 RX ORDER — LIDOCAINE HYDROCHLORIDE 10 MG/ML
0.5 INJECTION, SOLUTION EPIDURAL; INFILTRATION; INTRACAUDAL; PERINEURAL ONCE AS NEEDED
Status: CANCELLED | OUTPATIENT
Start: 2023-08-28

## 2023-08-28 RX ORDER — SODIUM CHLORIDE 9 MG/ML
500 INJECTION, SOLUTION INTRAVENOUS CONTINUOUS PRN
Status: DISCONTINUED | OUTPATIENT
Start: 2023-08-28 | End: 2023-08-28 | Stop reason: HOSPADM

## 2023-08-28 RX ORDER — SODIUM CHLORIDE 0.9 % (FLUSH) 0.9 %
10 SYRINGE (ML) INJECTION AS NEEDED
Status: DISCONTINUED | OUTPATIENT
Start: 2023-08-28 | End: 2023-08-28 | Stop reason: HOSPADM

## 2023-08-28 RX ORDER — ONDANSETRON 2 MG/ML
4 INJECTION INTRAMUSCULAR; INTRAVENOUS ONCE AS NEEDED
Status: DISCONTINUED | OUTPATIENT
Start: 2023-08-28 | End: 2023-08-28 | Stop reason: HOSPADM

## 2023-08-28 RX ADMIN — PROPOFOL 30 MG: 10 INJECTION, EMULSION INTRAVENOUS at 09:30

## 2023-08-28 RX ADMIN — PROPOFOL 70 MG: 10 INJECTION, EMULSION INTRAVENOUS at 09:24

## 2023-08-28 RX ADMIN — PROPOFOL 50 MG: 10 INJECTION, EMULSION INTRAVENOUS at 09:27

## 2023-08-28 RX ADMIN — LIDOCAINE HYDROCHLORIDE 50 MG: 20 INJECTION, SOLUTION EPIDURAL; INFILTRATION; INTRACAUDAL; PERINEURAL at 09:23

## 2023-08-28 RX ADMIN — SODIUM CHLORIDE 500 ML: 9 INJECTION, SOLUTION INTRAVENOUS at 08:50

## 2023-08-28 NOTE — ANESTHESIA POSTPROCEDURE EVALUATION
Patient: Telma Herrera    Procedure Summary       Date: 08/28/23 Room / Location: [facility] ENDOSCOPY 5 / [facility] ENDOSCOPY    Anesthesia Start: 0922 Anesthesia Stop: 0937    Procedure: ESOPHAGOGASTRODUODENOSCOPY WITH ANESTHESIA Diagnosis:       Jean-Baptiste's esophagus without dysplasia      (Jean-Baptiste's esophagus without dysplasia [K22.70])    Surgeons: Saeed Tom MD Provider: Michael Dumont CRNA    Anesthesia Type: MAC ASA Status: 2            Anesthesia Type: MAC    Vitals  Vitals Value Taken Time   BP     Temp     Pulse 94 08/28/23 0937   Resp     SpO2 95 % 08/28/23 0937   Vitals shown include unvalidated device data.        Post Anesthesia Care and Evaluation    Patient location during evaluation: PHASE II  Patient participation: complete - patient participated  Level of consciousness: awake  Pain score: 0  Pain management: adequate    Airway patency: patent  Anesthetic complications: No anesthetic complications  PONV Status: none  Cardiovascular status: acceptable  Respiratory status: acceptable  Hydration status: acceptable

## 2023-08-28 NOTE — ANESTHESIA PREPROCEDURE EVALUATION
Anesthesia Evaluation     Patient summary reviewed   no history of anesthetic complications:   NPO Solid Status: > 8 hours             Airway   Mallampati: II  Dental      Pulmonary - negative pulmonary ROS   (-) not a smoker  Cardiovascular   Exercise tolerance: excellent (>7 METS)    (+) hypertension      Neuro/Psych- negative ROS  GI/Hepatic/Renal/Endo    (+) obesity, GERD    Musculoskeletal     Abdominal    Substance History      OB/GYN          Other   arthritis,   history of cancer                    Anesthesia Plan    ASA 2     MAC     intravenous induction     Anesthetic plan, risks, benefits, and alternatives have been provided, discussed and informed consent has been obtained with: patient and spouse/significant other.      CODE STATUS:

## 2023-08-28 NOTE — H&P
[facility] Gastroenterology  Pre Procedure History & Physical    Chief Complaint:   Jean-Baptiste's    Subjective     HPI:   Jean-Baptiste's    Past Medical History:   Past Medical History:   Diagnosis Date    Arthritis     Jean-Baptiste's esophagus     Breast cancer 2019    left breast    Drug therapy     Fatty liver disease, nonalcoholic     GERD (gastroesophageal reflux disease)     Hiatal hernia     Hx of radiation therapy 2019    left breast    Hypertension     Hypokalemia     Idiopathic hematuria     Kidney stones     Lipoma     Osteopenia     Renal disorder     UTI (urinary tract infection)        Past Surgical History:  Past Surgical History:   Procedure Laterality Date    BREAST BIOPSY Left 2019    cancer    BREAST LUMPECTOMY Left 2019    COLONOSCOPY      2011?    COLONOSCOPY N/A 05/17/2022    Procedure: COLONOSCOPY WITH ANESTHESIA;  Surgeon: Saeed Tom MD;  Location: [facility] ENDOSCOPY;  Service: Gastroenterology;  Laterality: N/A;  preop; screening   postop; diverticulosis ; hemmhroids   PCP Shanelle Foster    DILATION AND CURETTAGE, DIAGNOSTIC / THERAPEUTIC      ENDOSCOPY N/A 05/17/2022    Procedure: ESOPHAGOGASTRODUODENOSCOPY WITH ANESTHESIA;  Surgeon: Saeed Tom MD;  Location: [facility] ENDOSCOPY;  Service: Gastroenterology;  Laterality: N/A;  preop; gerd  postop; esophagitis   PCP Shanelle Foster    ENDOSCOPY N/A 06/03/2022    Procedure: ESOPHAGOGASTRODUODENOSCOPY;  Surgeon: Saeed Tom MD;  Location: [facility] ENDOSCOPY;  Service: Gastroenterology;  Laterality: N/A;  preop; GERD  postop hiatal hernia  PCP Thelma Foster     LIPOMA RESECTION Left 2007    axilla area    OTHER SURGICAL HISTORY      rectocele    RECTOCELE REPAIR      WISDOM TOOTH EXTRACTION         Family History:  Family History   Problem Relation Age of Onset    Leukemia Father     Lymphoma Father     Hypertension Mother     Diabetes Mother     Stroke Mother     High cholesterol Brother     Hypertension Brother     Diabetes Brother      "Colon cancer Neg Hx     Colon polyps Neg Hx     Ovarian cancer Neg Hx     Breast cancer Neg Hx     Uterine cancer Neg Hx     Melanoma Neg Hx     Prostate cancer Neg Hx        Social History:   reports that she has never smoked. She has never used smokeless tobacco. She reports current alcohol use. She reports that she does not currently use drugs.    Medications:   Prior to Admission medications    Medication Sig Start Date End Date Taking? Authorizing Provider   amLODIPine (NORVASC) 5 MG tablet TAKE 1 TABLET BY MOUTH EVERY DAY 1/16/23  Yes Thelma Foster APRN   anastrozole (ARIMIDEX) 1 MG tablet Take  by mouth Daily.   Yes Albert Arzate MD   Biotin 5 MG tablet Take 3 tablets by mouth.   Yes Albert Arzate MD   COLLAGEN PO Take 10 mg by mouth Daily.   Yes Albert Arzate MD   hydroCHLOROthiazide (HYDRODIURIL) 12.5 MG tablet TAKE 1 TABLET BY MOUTH EVERY DAY 1/16/23  Yes Thelma Foster APRN   KLOR-CON 20 MEQ CR tablet TAKE 1 TABLET BY MOUTH EVERY DAY 7/6/23  Yes Adrian Lopez APRN   levocetirizine (XYZAL) 5 MG tablet Take 0.5 tablets by mouth Every Evening.   Yes Albert Arzate MD   Multiple Vitamins-Calcium (ONE-A-DAY WOMENS FORMULA PO) One-A-Day Womens Formula   Yes Albert Arzate MD   pantoprazole (PROTONIX) 40 MG EC tablet TAKE 1 TABLET BY MOUTH EVERY DAY 6/29/23  Yes Saeed Tom MD   psyllium (METAMUCIL SMOOTH TEXTURE) 28 % packet Take  by mouth.   Yes Albert Arzate MD   amLODIPine (NORVASC) 5 MG tablet Take 1 tablet by mouth Daily.    Albert Arzate MD       Allergies:  Ace inhibitors, Omeprazole, Sulfa antibiotics, and Levofloxacin    ROS:    General: Weight stable  Resp: No SOA  Cardiovascular: No CP    Objective     Blood pressure 142/86, pulse 80, temperature 96.9 °F (36.1 °C), temperature source Temporal, resp. rate 16, height 162.6 cm (64\"), weight 85.7 kg (189 lb), SpO2 99 %.    Physical Exam   Constitutional: Pt is oriented " to person, place, and in no distress.   Cardiovascular: Normal rate, regular rhythm.    Pulmonary/Chest: Effort normal. No respiratory distress.  Abdominal: Non-distended.  Psychiatric: Mood, memory, affect and judgment appear normal.     Assessment & Plan     Diagnosis:  Jean-Baptiste's    Anticipated Surgical Procedure:  Endoscopy    The risks, benefits, and alternatives of this procedure have been discussed with the patient or the responsible party- the patient understands and agrees to proceed.      EMR Dragon/transcription disclaimer:  Much of this encounter note is electronic transcription/translation of spoken language to printed text.  The electronic translation of spoken language may be erroneous, or at times, nonsensical words or phrases may be inadvertently transcribed.  Although I have reviewed the note for such errors, some may still exist.

## 2023-09-20 ENCOUNTER — HOSPITAL ENCOUNTER (OUTPATIENT)
Dept: BONE DENSITY | Facility: HOSPITAL | Age: 63
Discharge: HOME | End: 2023-09-20
Admitting: NURSE PRACTITIONER
Payer: COMMERCIAL

## 2023-09-20 DIAGNOSIS — Z13.820 ENCOUNTER FOR SCREENING FOR OSTEOPOROSIS: ICD-10-CM

## 2023-09-20 PROCEDURE — 77080 DXA BONE DENSITY AXIAL: CPT

## 2023-09-27 RX ORDER — HYDROCHLOROTHIAZIDE 12.5 MG/1
TABLET ORAL
Qty: 90 TABLET | Refills: 1 | Status: SHIPPED | OUTPATIENT
Start: 2023-09-27

## 2023-09-27 RX ORDER — PANTOPRAZOLE SODIUM 40 MG/1
TABLET, DELAYED RELEASE ORAL
Qty: 90 TABLET | Refills: 3 | Status: SHIPPED | OUTPATIENT
Start: 2023-09-27

## 2023-09-27 NOTE — TELEPHONE ENCOUNTER
Rx Refill Note  Requested Prescriptions     Pending Prescriptions Disp Refills    hydroCHLOROthiazide (HYDRODIURIL) 12.5 MG tablet [Pharmacy Med Name: HYDROCHLOROTHIAZIDE 12.5 MG TB] 90 tablet 1     Sig: TAKE 1 TABLET BY MOUTH EVERY DAY      Last office visit with prescribing clinician: 4/17/2023   Next office visit with prescribing clinician: 10/10/2023                         Would you like a call back once the refill request has been completed: [] Yes [] No    If the office needs to give you a call back, can they leave a voicemail: [] Yes [] No    Deedee Lopez RN  09/27/23, 07:09 CDT

## 2023-10-08 DIAGNOSIS — I10 PRIMARY HYPERTENSION: ICD-10-CM

## 2023-10-09 RX ORDER — AMLODIPINE BESYLATE 5 MG/1
TABLET ORAL
Qty: 90 TABLET | Refills: 1 | Status: SHIPPED | OUTPATIENT
Start: 2023-10-09 | End: 2023-10-10

## 2023-10-09 NOTE — TELEPHONE ENCOUNTER
Rx Refill Note  Requested Prescriptions     Pending Prescriptions Disp Refills    amLODIPine (NORVASC) 5 MG tablet [Pharmacy Med Name: AMLODIPINE BESYLATE 5 MG TAB] 90 tablet 1     Sig: TAKE 1 TABLET BY MOUTH EVERY DAY      Last office visit with prescribing clinician: 4/17/2023   Next office visit with prescribing clinician: 10/10/2023                         Would you like a call back once the refill request has been completed: [] Yes [] No    If the office needs to give you a call back, can they leave a voicemail: [] Yes [] No    Deedee Lopez RN  10/09/23, 07:25 CDT

## 2023-10-10 ENCOUNTER — OFFICE VISIT (OUTPATIENT)
Dept: INTERNAL MEDICINE | Facility: CLINIC | Age: 63
End: 2023-10-10
Payer: COMMERCIAL

## 2023-10-10 VITALS
BODY MASS INDEX: 32.61 KG/M2 | OXYGEN SATURATION: 95 % | DIASTOLIC BLOOD PRESSURE: 82 MMHG | RESPIRATION RATE: 17 BRPM | TEMPERATURE: 97.5 F | HEART RATE: 72 BPM | WEIGHT: 191 LBS | HEIGHT: 64 IN | SYSTOLIC BLOOD PRESSURE: 113 MMHG

## 2023-10-10 DIAGNOSIS — Z23 ENCOUNTER FOR IMMUNIZATION: ICD-10-CM

## 2023-10-10 DIAGNOSIS — I10 PRIMARY HYPERTENSION: Primary | ICD-10-CM

## 2023-10-10 DIAGNOSIS — E78.5 HYPERLIPIDEMIA, UNSPECIFIED HYPERLIPIDEMIA TYPE: ICD-10-CM

## 2023-10-10 PROCEDURE — 99213 OFFICE O/P EST LOW 20 MIN: CPT | Performed by: NURSE PRACTITIONER

## 2023-10-10 PROCEDURE — 90750 HZV VACC RECOMBINANT IM: CPT | Performed by: NURSE PRACTITIONER

## 2023-10-10 RX ORDER — CYCLOBENZAPRINE HCL 10 MG
TABLET ORAL
COMMUNITY

## 2023-10-10 RX ORDER — FLUTICASONE PROPIONATE 50 MCG
2 SPRAY, SUSPENSION (ML) NASAL DAILY
COMMUNITY

## 2023-10-10 RX ORDER — TAMARIND SEED/TURMERIC EXTRACT 250 MG
TABLET ORAL
COMMUNITY
Start: 2023-09-01

## 2023-10-10 NOTE — LETTER
"[facility]  Vaccine Consent Form    Patient Name:  Telma Herrera  Patient :  1960     Vaccine(s) Ordered    Shingrix Vaccine        Screening Checklist  The following questions should be completed prior to vaccination. If you answer "yes" to any question, it does not necessarily mean you should not be vaccinated. It just means we may need to clarify or ask more questions. If a question is unclear, please ask your healthcare provider to explain it.    Yes No   Any fever or moderate to severe illness today (mild illness and/or antibiotic treatment are not contraindications)?     Do you have a history of a serious reaction to any previous vaccinations, such as anaphylaxis, encephalopathy within 7 days, Guillain-[place] syndrome within 6 weeks, seizure?     Have you received any live vaccine(s) in the past month (MMR, CARLOS)?     Do you have an anaphylactic allergy to latex (DTaP, DTaP-IPV, Hep A, Hep B, MenB, RV, Td, Tdap), baker's yeast (Hep B, HPV), or gelatin (CARLOS, MMR)?     Do you have an anaphylactic allergy to neomycin (Rabies, CARLOS, MMR, IPV, Hep A), polymyxin B (IPV), or streptomycin (IPV)?      Any cancer, leukemia, AIDS, or other immune system disorder? (CARLOS, MMR, RV)     Do you have a parent, brother, or sister with an immune system problem (if immune competence of vaccine recipient clinically verified, can proceed)? (MMR, CARLOS)     Any recent steroid treatments for >2 weeks, chemotherapy, or radiation treatment? (CARLOS, MMR)     Have you received antibody-containing blood transfusions or IVIG in the past 11 months (recommended interval is dependent on product)? (MMR, CARLOS)     Have you taken antiviral drugs (acyclovir, famciclovir, valacyclovir) in the last 24 or 48 hours, respectively (CARLOS)?      Are you pregnant or planning to become pregnant within 1 month? (CARLOS, MMR, HPV, IPV, MenB; For hep B- refer to Engerix-B)     For infants, have you ever been told your child has had intussusception or a " "medical emergency involving obstruction of the intestine (RV)? If not for an infant, can skip this question.         *Ordering Physician/APC should be consulted if "yes" is checked by the patient or guardian above.      I have received, read, and understand the Vaccine Information Statement (VIS) for each vaccine ordered above.  I have considered my health status as well as the health status of my close contacts.  I have taken the opportunity to discuss my vaccine questions with my health care provider.   I have requested that the ordered vaccine(s) be given to me.  I understand the benefits and risks of the vaccines.  I understand that I should remain in the clinic for 15 minutes after receiving the vaccine(s).  _________________________________________________________  Signature of Patient or Parent/Legal Guardian ____________________  Date     "

## 2023-10-10 NOTE — PROGRESS NOTES
Subjective     Chief Complaint   Patient presents with    Hyperlipidemia       History of Present Illness    The patient states she is taking amlodipine and hydrochlorothiazide. She states her ankles swell in the evenings, especially if she has been on her feet a lot. The patient states it does not bother her. She states she started taking [place] krill oil for her joints. The patient states it helped and is good for her liver. She states the left side is worse than the right.    The patient states she has gained weight because she is stressed to the max over. She states she is not watching her weight like she was.    Review of Systems   Otherwise complete ROS reviewed and negative except as mentioned in the HPI.    Past Medical History:   Past Medical History:   Diagnosis Date    Arthritis     Jean-Baptiste's esophagus     Breast cancer 2019    left breast    Drug therapy     Fatty liver disease, nonalcoholic     GERD (gastroesophageal reflux disease)     Hiatal hernia     Hx of radiation therapy 2019    left breast    Hypertension     Hypokalemia     Idiopathic hematuria     Kidney stones     Lipoma     Osteopenia     Renal disorder     UTI (urinary tract infection)      Past Surgical History:  Past Surgical History:   Procedure Laterality Date    BREAST BIOPSY Left 2019    cancer    BREAST LUMPECTOMY Left 2019    COLONOSCOPY      2011?    COLONOSCOPY N/A 05/17/2022    Procedure: COLONOSCOPY WITH ANESTHESIA;  Surgeon: Saeed Tom MD;  Location: [facility] ENDOSCOPY;  Service: Gastroenterology;  Laterality: N/A;  preop; screening   postop; diverticulosis ; hemmhroids   PCP Shanelle Foster    DILATION AND CURETTAGE, DIAGNOSTIC / THERAPEUTIC      ENDOSCOPY N/A 05/17/2022    Procedure: ESOPHAGOGASTRODUODENOSCOPY WITH ANESTHESIA;  Surgeon: Saeed Tom MD;  Location: [facility] ENDOSCOPY;  Service: Gastroenterology;  Laterality: N/A;  preop; gerd  postop; esophagitis   PCP Shanelle Foster    ENDOSCOPY N/A 06/03/2022     Procedure: ESOPHAGOGASTRODUODENOSCOPY;  Surgeon: Saeed Tom MD;  Location: [facility] ENDOSCOPY;  Service: Gastroenterology;  Laterality: N/A;  preop; GERD  postop hiatal hernia  PCP Thelma Foster     ENDOSCOPY N/A 8/28/2023    Procedure: ESOPHAGOGASTRODUODENOSCOPY WITH ANESTHESIA;  Surgeon: Saeed Tom MD;  Location: [facility] ENDOSCOPY;  Service: Gastroenterology;  Laterality: N/A;  pre baarrett's, hiatal hernia  post cueto's hiatal hernia,duodenal bulb polyp  Thelma Foster    LIPOMA RESECTION Left 2007    axilla area    OTHER SURGICAL HISTORY      rectocele    RECTOCELE REPAIR      WISDOM TOOTH EXTRACTION       Social History:  reports that she has never smoked. She has never used smokeless tobacco. She reports current alcohol use. She reports that she does not currently use drugs.    Family History: family history includes Diabetes in her brother and mother; High cholesterol in her brother; Hypertension in her brother and mother; Leukemia in her father; Lymphoma in her father; Stroke in her mother.       Allergies:  Allergies   Allergen Reactions    Ace Inhibitors Other (See Comments), Swelling and Unknown - High Severity     lisinopril  lisinopril      Omeprazole Other (See Comments) and Unknown - High Severity     Developed swollen lips with tiny white sores    Other Angioedema    Sulfa Antibiotics Swelling and Unknown - Low Severity    Levofloxacin Myalgia and Other (See Comments)     Muscle pain       Medications:  Prior to Admission medications    Medication Sig Start Date End Date Taking? Authorizing Provider   [facility] Krill Oil 500 MG capsule  9/1/23  Yes Albert Arzate MD   amLODIPine (NORVASC) 5 MG tablet Take 1 tablet by mouth Daily.    Albert Arzate MD   anastrozole (ARIMIDEX) 1 MG tablet Take  by mouth Daily.    Albert Arzate MD   Biotin 5 MG tablet Take 3 tablets by mouth.    Albert Arzate MD   COLLAGEN PO Take 10 mg by mouth Daily.    Carito  "MD Albert   cyclobenzaprine (FLEXERIL) 10 MG tablet     ProviderAlbert MD   fluticasone (FLONASE) 50 MCG/ACT nasal spray 2 sprays by Each Nare route Daily.    Albert Arzate MD   hydroCHLOROthiazide (HYDRODIURIL) 12.5 MG tablet TAKE 1 TABLET BY MOUTH EVERY DAY 9/27/23   Thelma Foster APRN   KLOR-CON 20 MEQ CR tablet TAKE 1 TABLET BY MOUTH EVERY DAY 7/6/23   Adrian Lopez APRN   levocetirizine (XYZAL) 5 MG tablet Take 0.5 tablets by mouth Every Evening.    Albert Arzate MD   Multiple Vitamins-Calcium (ONE-A-DAY WOMENS FORMULA PO) One-A-Day Womens Formula    Albert Arzate MD   pantoprazole (PROTONIX) 40 MG EC tablet TAKE 1 TABLET BY MOUTH EVERY DAY 9/27/23   Saeed Tom MD   psyllium (METAMUCIL SMOOTH TEXTURE) 28 % packet Take  by mouth.    Albert Arzate MD   amLODIPine (NORVASC) 5 MG tablet TAKE 1 TABLET BY MOUTH EVERY DAY 10/9/23 10/10/23  Thelma Foster APRN       Objective     Vital Signs: /82   Pulse 72   Temp 97.5 øF (36.4 øC)   Resp 17   Ht 162.6 cm (64\")   Wt 86.6 kg (191 lb)   SpO2 95%   BMI 32.79 kg/mý   Physical Exam  Vitals reviewed.   Constitutional:       Appearance: She is well-developed.   HENT:      Head: Normocephalic and atraumatic.   Eyes:      Pupils: Pupils are equal, round, and reactive to light.   Neck:      Vascular: No JVD.   Cardiovascular:      Rate and Rhythm: Normal rate and regular rhythm.   Pulmonary:      Effort: Pulmonary effort is normal.   Abdominal:      General: Bowel sounds are normal.      Palpations: Abdomen is soft.   Musculoskeletal:         General: No deformity.      Cervical back: Normal range of motion and neck supple.   Lymphadenopathy:      Cervical: No cervical adenopathy.   Skin:     General: Skin is warm and dry.   Neurological:      Mental Status: She is alert and oriented to person, place, and time.   Psychiatric:         Behavior: Behavior normal.         Thought Content: " Thought content normal.         Judgment: Judgment normal.       Results Reviewed:  Glucose   Date Value Ref Range Status   04/13/2023 85 70 - 99 mg/dL Final   12/03/2020 91 74 - 109 mg/dL Final     BUN   Date Value Ref Range Status   04/13/2023 12 8 - 27 mg/dL Final   12/03/2020 11 6 - 20 mg/dL Final     Creatinine   Date Value Ref Range Status   04/13/2023 0.92 0.57 - 1.00 mg/dL Final   12/03/2020 0.6 0.5 - 0.9 mg/dL Final     Sodium   Date Value Ref Range Status   04/13/2023 142 134 - 144 mmol/L Final   12/03/2020 140 136 - 145 mmol/L Final     Potassium   Date Value Ref Range Status   04/13/2023 3.8 3.5 - 5.2 mmol/L Final   12/03/2020 4.0 3.5 - 5.0 mmol/L Final     Chloride   Date Value Ref Range Status   04/13/2023 102 96 - 106 mmol/L Final   12/03/2020 101 98 - 111 mmol/L Final     CO2   Date Value Ref Range Status   12/03/2020 28 22 - 29 mmol/L Final     Total CO2   Date Value Ref Range Status   04/13/2023 28 20 - 29 mmol/L Final     Calcium   Date Value Ref Range Status   04/13/2023 9.6 8.7 - 10.3 mg/dL Final   12/03/2020 9.7 8.6 - 10.0 mg/dL Final     ALT (SGPT)   Date Value Ref Range Status   04/13/2023 17 0 - 32 IU/L Final   12/03/2020 13 5 - 33 U/L Final     AST (SGOT)   Date Value Ref Range Status   04/13/2023 20 0 - 40 IU/L Final   12/03/2020 18 5 - 32 U/L Final     WBC   Date Value Ref Range Status   04/13/2023 5.1 3.4 - 10.8 x10E3/uL Final   12/03/2020 6.9 4.8 - 10.8 K/uL Final     Hematocrit   Date Value Ref Range Status   04/13/2023 43.9 34.0 - 46.6 % Final   12/03/2020 43.5 37.0 - 47.0 % Final     Platelets   Date Value Ref Range Status   04/13/2023 232 150 - 450 x10E3/uL Final   12/03/2020 264 130 - 400 K/uL Final   06/12/2019 303 150 - 450 10*3/uL Final     Triglycerides   Date Value Ref Range Status   04/13/2023 136 0 - 149 mg/dL Final   07/25/2018 63 <=150 mg/dL Final     Comment:     Desirable    <149 mg/dL  Borderline   150-199 mg/dL  Higher Risk  >200 mg/dL     HDL Cholesterol   Date Value  Ref Range Status   04/13/2023 50 >39 mg/dL Final   07/25/2018 58 >=40 mg/dL Final     Comment:     Desirable    >39 mg/dL  Higher Risk  <39 mg/dL     LDL Cholesterol    Date Value Ref Range Status   07/25/2018 104 <=130 mg/dL Final     Comment:     Desirable    <100mg/dL  Borderline   100-159 mg/dL  Higher Risk  >160 mg/dL     LDL Chol Calc (NIH)   Date Value Ref Range Status   04/13/2023 103 (H) 0 - 99 mg/dL Final     Hemoglobin A1C   Date Value Ref Range Status   04/13/2023 5.6 4.8 - 5.6 % Final     Comment:              Prediabetes: 5.7 - 6.4           Diabetes: >6.4           Glycemic control for adults with diabetes: <7.0           Assessment / Plan     Assessment/Plan:  Diagnoses and all orders for this visit:    1. Primary hypertension (Primary)  -     Cancel: Comprehensive Metabolic Panel  -     Comprehensive Metabolic Panel    2. Hyperlipidemia, unspecified hyperlipidemia type  -     Cancel: Lipid Panel  -     Lipid Panel    3. Encounter for immunization  -     Shingrix Vaccine      No follow-ups on file. unless patient needs to be seen sooner or acute issues arise.    Code Status: Full    I have discussed the patient results/orders and and plan/recommendation with them at today's visit.      RAYRAY Velázquez   10/10/2023    Transcribed from ambient dictation for RAYRAY Velázquez by Vidya Guzman.  10/10/23   12:07 CDT    Patient or patient representative verbalized consent to the visit recording.  I have personally performed the services described in this document as transcribed by the above individual, and it is both accurate and complete.     Answers submitted by the patient for this visit:  Primary Reason for Visit (Submitted on 10/10/2023)  What is the primary reason for your visit?: High Blood Pressure

## 2023-10-11 LAB
ALBUMIN SERPL-MCNC: 4.6 G/DL (ref 3.9–4.9)
ALBUMIN/GLOB SERPL: 2.1 {RATIO} (ref 1.2–2.2)
ALP SERPL-CCNC: 143 IU/L (ref 44–121)
ALT SERPL-CCNC: 14 IU/L (ref 0–32)
AST SERPL-CCNC: 19 IU/L (ref 0–40)
BILIRUB SERPL-MCNC: 0.5 MG/DL (ref 0–1.2)
BUN SERPL-MCNC: 11 MG/DL (ref 8–27)
BUN/CREAT SERPL: 13 (ref 12–28)
CALCIUM SERPL-MCNC: 9.5 MG/DL (ref 8.7–10.3)
CHLORIDE SERPL-SCNC: 103 MMOL/L (ref 96–106)
CHOLEST SERPL-MCNC: 181 MG/DL (ref 100–199)
CO2 SERPL-SCNC: 26 MMOL/L (ref 20–29)
CREAT SERPL-MCNC: 0.82 MG/DL (ref 0.57–1)
EGFRCR SERPLBLD CKD-EPI 2021: 81 ML/MIN/1.73
GLOBULIN SER CALC-MCNC: 2.2 G/DL (ref 1.5–4.5)
GLUCOSE SERPL-MCNC: 90 MG/DL (ref 70–99)
HDLC SERPL-MCNC: 45 MG/DL
LDLC SERPL CALC-MCNC: 111 MG/DL (ref 0–99)
POTASSIUM SERPL-SCNC: 4.1 MMOL/L (ref 3.5–5.2)
PROT SERPL-MCNC: 6.8 G/DL (ref 6–8.5)
SODIUM SERPL-SCNC: 145 MMOL/L (ref 134–144)
TRIGL SERPL-MCNC: 141 MG/DL (ref 0–149)
VLDLC SERPL CALC-MCNC: 25 MG/DL (ref 5–40)

## 2024-02-01 ENCOUNTER — OFFICE VISIT (OUTPATIENT)
Dept: OBSTETRICS AND GYNECOLOGY | Age: 64
End: 2024-02-01
Payer: COMMERCIAL

## 2024-02-01 VITALS
HEIGHT: 64 IN | WEIGHT: 188 LBS | BODY MASS INDEX: 32.1 KG/M2 | DIASTOLIC BLOOD PRESSURE: 80 MMHG | SYSTOLIC BLOOD PRESSURE: 118 MMHG

## 2024-02-01 DIAGNOSIS — Z17.0 MALIGNANT NEOPLASM OF LOWER-OUTER QUADRANT OF LEFT BREAST OF FEMALE, ESTROGEN RECEPTOR POSITIVE: ICD-10-CM

## 2024-02-01 DIAGNOSIS — M85.80 OSTEOPENIA, SENILE: ICD-10-CM

## 2024-02-01 DIAGNOSIS — N39.3 SUI (STRESS URINARY INCONTINENCE, FEMALE): ICD-10-CM

## 2024-02-01 DIAGNOSIS — N81.6 CYSTOCELE WITH RECTOCELE: ICD-10-CM

## 2024-02-01 DIAGNOSIS — Z12.31 ENCOUNTER FOR SCREENING MAMMOGRAM FOR BREAST CANCER: ICD-10-CM

## 2024-02-01 DIAGNOSIS — N89.8 VAGINAL DRYNESS: ICD-10-CM

## 2024-02-01 DIAGNOSIS — Z01.419 WELL WOMAN EXAM WITH ROUTINE GYNECOLOGICAL EXAM: Primary | ICD-10-CM

## 2024-02-01 DIAGNOSIS — C50.512 MALIGNANT NEOPLASM OF LOWER-OUTER QUADRANT OF LEFT BREAST OF FEMALE, ESTROGEN RECEPTOR POSITIVE: ICD-10-CM

## 2024-02-01 DIAGNOSIS — N81.10 CYSTOCELE WITH RECTOCELE: ICD-10-CM

## 2024-02-01 PROCEDURE — 87625 HPV TYPES 16 & 18 ONLY: CPT | Performed by: NURSE PRACTITIONER

## 2024-02-01 PROCEDURE — 87624 HPV HI-RISK TYP POOLED RSLT: CPT | Performed by: NURSE PRACTITIONER

## 2024-02-01 PROCEDURE — G0123 SCREEN CERV/VAG THIN LAYER: HCPCS | Performed by: NURSE PRACTITIONER

## 2024-02-08 LAB
GEN CATEG CVX/VAG CYTO-IMP: NORMAL
HPV I/H RISK 4 DNA CVX QL PROBE+SIG AMP: DETECTED
HPV16 DNA SPEC QL NAA+PROBE: NOT DETECTED
HPV18+45 E6+E7 MRNA CVX QL NAA+PROBE: DETECTED
LAB AP CASE REPORT: NORMAL
LAB AP GYN ADDITIONAL INFORMATION: NORMAL
LAB AP GYN OTHER FINDINGS: NORMAL
Lab: NORMAL
PATH INTERP SPEC-IMP: NORMAL
STAT OF ADQ CVX/VAG CYTO-IMP: NORMAL

## 2024-02-15 ENCOUNTER — OFFICE VISIT (OUTPATIENT)
Dept: OBSTETRICS AND GYNECOLOGY | Age: 64
End: 2024-02-15
Payer: COMMERCIAL

## 2024-02-15 VITALS
WEIGHT: 185 LBS | SYSTOLIC BLOOD PRESSURE: 124 MMHG | DIASTOLIC BLOOD PRESSURE: 84 MMHG | BODY MASS INDEX: 31.58 KG/M2 | HEIGHT: 64 IN

## 2024-02-15 DIAGNOSIS — R87.810 PAP SMEAR OF CERVIX SHOWS HIGH RISK HPV PRESENT: Primary | ICD-10-CM

## 2024-02-15 PROCEDURE — 88305 TISSUE EXAM BY PATHOLOGIST: CPT | Performed by: OBSTETRICS & GYNECOLOGY

## 2024-02-19 LAB
CYTO UR: NORMAL
LAB AP CASE REPORT: NORMAL
LAB AP CLINICAL INFORMATION: NORMAL
LAB AP INTRADEPARTMENTAL CONSULT: NORMAL
Lab: NORMAL
PATH REPORT.FINAL DX SPEC: NORMAL
PATH REPORT.GROSS SPEC: NORMAL

## 2024-03-29 DIAGNOSIS — I10 ESSENTIAL (PRIMARY) HYPERTENSION: ICD-10-CM

## 2024-03-29 RX ORDER — AMLODIPINE BESYLATE 5 MG/1
5 TABLET ORAL DAILY
Qty: 90 TABLET | Refills: 1 | Status: SHIPPED | OUTPATIENT
Start: 2024-03-29

## 2024-03-29 RX ORDER — HYDROCHLOROTHIAZIDE 12.5 MG/1
TABLET ORAL
Qty: 90 TABLET | Refills: 1 | Status: SHIPPED | OUTPATIENT
Start: 2024-03-29

## 2024-03-29 NOTE — TELEPHONE ENCOUNTER
Rx Refill Note  Requested Prescriptions     Pending Prescriptions Disp Refills    amLODIPine (NORVASC) 5 MG tablet [Pharmacy Med Name: AMLODIPINE BESYLATE 5 MG TAB] 90 tablet 1     Sig: TAKE 1 TABLET BY MOUTH EVERY DAY    hydroCHLOROthiazide 12.5 MG tablet [Pharmacy Med Name: HYDROCHLOROTHIAZIDE 12.5 MG TB] 90 tablet 1     Sig: TAKE 1 TABLET BY MOUTH EVERY DAY      Last office visit with prescribing clinician: 10/10/2023   Last telemedicine visit with prescribing clinician: Visit date not found   Next office visit with prescribing clinician: 4/25/2024                         Would you like a call back once the refill request has been completed: [] Yes [] No    If the office needs to give you a call back, can they leave a voicemail: [] Yes [] No    Deedee Lopez RN  03/29/24, 12:34 CDT

## 2024-04-02 RX ORDER — POTASSIUM CHLORIDE 1500 MG/1
TABLET, EXTENDED RELEASE ORAL
Qty: 90 TABLET | Refills: 2 | Status: SHIPPED | OUTPATIENT
Start: 2024-04-02

## 2024-04-02 NOTE — TELEPHONE ENCOUNTER
Rx Refill Note  Requested Prescriptions     Pending Prescriptions Disp Refills    Klor-Con M20 20 MEQ CR tablet [Pharmacy Med Name: KLOR-CON M20 TABLET] 90 tablet 2     Sig: TAKE 1 TABLET BY MOUTH EVERY DAY      Last office visit with prescribing clinician: 10/10/2023   Last telemedicine visit with prescribing clinician: Visit date not found   Next office visit with prescribing clinician: 4/25/2024                         Would you like a call back once the refill request has been completed: [] Yes [] No    If the office needs to give you a call back, can they leave a voicemail: [] Yes [] No    Deedee Lopez RN  04/02/24, 11:00 CDT

## 2024-04-22 ENCOUNTER — LAB (OUTPATIENT)
Dept: INTERNAL MEDICINE | Facility: CLINIC | Age: 64
End: 2024-04-22
Payer: COMMERCIAL

## 2024-04-22 DIAGNOSIS — E55.9 VITAMIN D DEFICIENCY: ICD-10-CM

## 2024-04-22 DIAGNOSIS — Z00.00 ROUTINE GENERAL MEDICAL EXAMINATION AT A HEALTH CARE FACILITY: Primary | ICD-10-CM

## 2024-04-23 LAB
25(OH)D3+25(OH)D2 SERPL-MCNC: 38 NG/ML (ref 30–100)
ALBUMIN SERPL-MCNC: 4.5 G/DL (ref 3.9–4.9)
ALBUMIN/GLOB SERPL: 1.7 {RATIO} (ref 1.2–2.2)
ALP SERPL-CCNC: 151 IU/L (ref 44–121)
ALT SERPL-CCNC: 19 IU/L (ref 0–32)
AST SERPL-CCNC: 19 IU/L (ref 0–40)
BASOPHILS # BLD AUTO: 0.1 X10E3/UL (ref 0–0.2)
BASOPHILS NFR BLD AUTO: 1 %
BILIRUB SERPL-MCNC: 0.4 MG/DL (ref 0–1.2)
BUN SERPL-MCNC: 13 MG/DL (ref 8–27)
BUN/CREAT SERPL: 16 (ref 12–28)
CALCIUM SERPL-MCNC: 9.6 MG/DL (ref 8.7–10.3)
CHLORIDE SERPL-SCNC: 101 MMOL/L (ref 96–106)
CHOLEST SERPL-MCNC: 196 MG/DL (ref 100–199)
CO2 SERPL-SCNC: 27 MMOL/L (ref 20–29)
CREAT SERPL-MCNC: 0.83 MG/DL (ref 0.57–1)
EGFRCR SERPLBLD CKD-EPI 2021: 79 ML/MIN/1.73
EOSINOPHIL # BLD AUTO: 0.2 X10E3/UL (ref 0–0.4)
EOSINOPHIL NFR BLD AUTO: 4 %
ERYTHROCYTE [DISTWIDTH] IN BLOOD BY AUTOMATED COUNT: 13 % (ref 11.7–15.4)
GLOBULIN SER CALC-MCNC: 2.6 G/DL (ref 1.5–4.5)
GLUCOSE SERPL-MCNC: 97 MG/DL (ref 70–99)
HBA1C MFR BLD: 5.7 % (ref 4.8–5.6)
HCT VFR BLD AUTO: 43.2 % (ref 34–46.6)
HDLC SERPL-MCNC: 49 MG/DL
HGB BLD-MCNC: 14 G/DL (ref 11.1–15.9)
IMM GRANULOCYTES # BLD AUTO: 0 X10E3/UL (ref 0–0.1)
IMM GRANULOCYTES NFR BLD AUTO: 0 %
LDLC SERPL CALC-MCNC: 109 MG/DL (ref 0–99)
LYMPHOCYTES # BLD AUTO: 1.9 X10E3/UL (ref 0.7–3.1)
LYMPHOCYTES NFR BLD AUTO: 31 %
MCH RBC QN AUTO: 27.2 PG (ref 26.6–33)
MCHC RBC AUTO-ENTMCNC: 32.4 G/DL (ref 31.5–35.7)
MCV RBC AUTO: 84 FL (ref 79–97)
MONOCYTES # BLD AUTO: 0.4 X10E3/UL (ref 0.1–0.9)
MONOCYTES NFR BLD AUTO: 7 %
NEUTROPHILS # BLD AUTO: 3.4 X10E3/UL (ref 1.4–7)
NEUTROPHILS NFR BLD AUTO: 57 %
PLATELET # BLD AUTO: 266 X10E3/UL (ref 150–450)
POTASSIUM SERPL-SCNC: 3.5 MMOL/L (ref 3.5–5.2)
PROT SERPL-MCNC: 7.1 G/DL (ref 6–8.5)
RBC # BLD AUTO: 5.14 X10E6/UL (ref 3.77–5.28)
SODIUM SERPL-SCNC: 142 MMOL/L (ref 134–144)
T4 FREE SERPL-MCNC: 1.24 NG/DL (ref 0.82–1.77)
TRIGL SERPL-MCNC: 223 MG/DL (ref 0–149)
TSH SERPL DL<=0.005 MIU/L-ACNC: 1.6 UIU/ML (ref 0.45–4.5)
VIT B12 SERPL-MCNC: 1287 PG/ML (ref 232–1245)
VLDLC SERPL CALC-MCNC: 38 MG/DL (ref 5–40)
WBC # BLD AUTO: 6 X10E3/UL (ref 3.4–10.8)

## 2024-04-25 ENCOUNTER — OFFICE VISIT (OUTPATIENT)
Dept: INTERNAL MEDICINE | Facility: CLINIC | Age: 64
End: 2024-04-25
Payer: COMMERCIAL

## 2024-04-25 VITALS
BODY MASS INDEX: 33.29 KG/M2 | DIASTOLIC BLOOD PRESSURE: 83 MMHG | HEART RATE: 86 BPM | RESPIRATION RATE: 17 BRPM | HEIGHT: 64 IN | SYSTOLIC BLOOD PRESSURE: 116 MMHG | TEMPERATURE: 97.7 F | OXYGEN SATURATION: 97 % | WEIGHT: 195 LBS

## 2024-04-25 DIAGNOSIS — Z00.00 ROUTINE GENERAL MEDICAL EXAMINATION AT A HEALTH CARE FACILITY: Primary | ICD-10-CM

## 2024-04-25 DIAGNOSIS — I10 ESSENTIAL (PRIMARY) HYPERTENSION: ICD-10-CM

## 2024-04-25 DIAGNOSIS — J30.1 NON-SEASONAL ALLERGIC RHINITIS DUE TO POLLEN: ICD-10-CM

## 2024-04-25 DIAGNOSIS — R73.03 PREDIABETES: ICD-10-CM

## 2024-04-25 PROCEDURE — 99396 PREV VISIT EST AGE 40-64: CPT | Performed by: NURSE PRACTITIONER

## 2024-04-25 RX ORDER — MONTELUKAST SODIUM 10 MG/1
10 TABLET ORAL NIGHTLY
Qty: 90 TABLET | Refills: 1 | Status: SHIPPED | OUTPATIENT
Start: 2024-04-25

## 2024-04-25 NOTE — PROGRESS NOTES
Subjective     Chief Complaint   Patient presents with    Annual Exam       History of Present Illness    The patient is a 64-year-old female who presents for an annual exam.    The patient acknowledges a tendency to stress eat during periods of nervousness. In 02/2024, her oncologist, Dr. Sorto, has advised her to discontinue Arimidex in 07/2024, a goal she anticipates will aid in weight loss. Despite her efforts, her lowest recorded weight has been 185.4 pounds. She notes her potassium level was lower than normal. She reports persistent aches, which she attributes to the Arimidex. She denies experiencing depression but admits to occasional self-harming thoughts. Her urinary and bowel functions are normal. She denies experiencing chest pain, shortness of breath, or leg swelling. However, she notes that one of her legs swell towards the end of the day, which she attributes to her lymph node dissection. She expresses a desire to increase her physical activity by walking.    The patient expresses a desire to switch her allergy medication, citing Sudafed ineffective. She reports that Xyzal is ineffective and inquires about an alternative that will not harm her liver. She has not previously tried Singulair.    The patient reports a persistent rough spot on her skin, raising concerns about potential malignancy. She previously consulted a dermatologist in [place], but her physician has since relocated. She has a history of HPV.    She received her influenza vaccine.      Review of Systems   Constitutional:  Negative for activity change, appetite change, chills and fever.   HENT:  Negative for hearing loss, nosebleeds, tinnitus and trouble swallowing.    Eyes:  Negative for visual disturbance.   Respiratory:  Negative for cough, chest tightness, shortness of breath and wheezing.    Cardiovascular:  Negative for chest pain, palpitations and leg swelling.   Gastrointestinal:  Negative for abdominal distention,  abdominal pain, blood in stool, constipation, diarrhea, nausea and vomiting.   Endocrine: Negative for cold intolerance, heat intolerance, polydipsia, polyphagia and polyuria.   Genitourinary:  Negative for decreased urine volume, difficulty urinating, dysuria, flank pain, frequency and hematuria.   Musculoskeletal:  Negative for arthralgias, joint swelling and myalgias.   Skin:  Negative for rash.   Allergic/Immunologic: Negative for immunocompromised state.   Neurological:  Negative for dizziness, syncope, weakness, light-headedness and headaches.   Hematological:  Negative for adenopathy. Does not bruise/bleed easily.   Psychiatric/Behavioral:  Negative for confusion and sleep disturbance. The patient is not nervous/anxious.         Otherwise complete ROS reviewed and negative except as mentioned in the HPI.    Past Medical History:   Past Medical History:   Diagnosis Date    Anemia 2019    Due to chemotherapy    Arthritis     Jean-Baptiste's esophagus     Breast cancer 2019    left breast    Drug therapy     Fatty liver disease, nonalcoholic     GERD (gastroesophageal reflux disease)     Hiatal hernia     HL (hearing loss) 1981    Hx of radiation therapy 2019    left breast    Hypertension     Hypokalemia     Idiopathic hematuria     Kidney stones     Lipoma     Osteopenia     Renal disorder     UTI (urinary tract infection)     Visual impairment 6/2022    Vitreous detachment     Past Surgical History:  Past Surgical History:   Procedure Laterality Date    BREAST BIOPSY Left 2019    cancer    BREAST LUMPECTOMY Left 2019    COLONOSCOPY      2011?    COLONOSCOPY N/A 05/17/2022    Procedure: COLONOSCOPY WITH ANESTHESIA;  Surgeon: Saeed Tom MD;  Location: [facility] ENDOSCOPY;  Service: Gastroenterology;  Laterality: N/A;  preop; screening   postop; diverticulosis ; hemmhroids   PCP Shanelle Foster    DILATION AND CURETTAGE, DIAGNOSTIC / THERAPEUTIC      ENDOSCOPY N/A 05/17/2022    Procedure: ESOPHAGOGASTRODUODENOSCOPY  WITH ANESTHESIA;  Surgeon: Saeed Tom MD;  Location: [facility] ENDOSCOPY;  Service: Gastroenterology;  Laterality: N/A;  preop; gerd  postop; esophagitis   PCP Shanelle Foster    ENDOSCOPY N/A 06/03/2022    Procedure: ESOPHAGOGASTRODUODENOSCOPY;  Surgeon: Saeed Tom MD;  Location: [facility] ENDOSCOPY;  Service: Gastroenterology;  Laterality: N/A;  preop; GERD  postop hiatal hernia  PCP Thelma Foster     ENDOSCOPY N/A 08/28/2023    Procedure: ESOPHAGOGASTRODUODENOSCOPY WITH ANESTHESIA;  Surgeon: Saeed Tom MD;  Location: [facility] ENDOSCOPY;  Service: Gastroenterology;  Laterality: N/A;  pre baarrett's, hiatal hernia  post cueto's hiatal hernia,duodenal bulb polyp  Thelma Foster    LIPOMA RESECTION Left 2007    axilla area    LYMPH NODE BIOPSY  1/31/2019    In conjunction with breast lumpectomy    OTHER SURGICAL HISTORY      rectocele    RECTOCELE REPAIR      WISDOM TOOTH EXTRACTION       Social History:  reports that she has never smoked. She has never used smokeless tobacco. She reports that she does not currently use alcohol. She reports that she does not use drugs.    Family History: family history includes COPD in her brother; Cancer in her father; Diabetes in her brother and mother; High cholesterol in her brother; Hodgkin's lymphoma in her father; Hypertension in her brother and mother; Leukemia in her father; Lymphoma in her father; Stroke in her brother and mother; Vision loss in her maternal aunt.       Allergies:  Allergies   Allergen Reactions    Ace Inhibitors Other (See Comments), Swelling and Unknown - High Severity     lisinopril  lisinopril      Omeprazole Other (See Comments) and Unknown - High Severity     Developed swollen lips with tiny white sores    Sulfa Antibiotics Swelling and Unknown - Low Severity    Levofloxacin Myalgia and Other (See Comments)     Muscle pain      Lisinopril Angioedema     Medications:  Prior to Admission medications    Medication Sig Start Date End  "Date Taking? Authorizing Provider   amLODIPine (NORVASC) 5 MG tablet TAKE 1 TABLET BY MOUTH EVERY DAY 3/29/24   Tehlma oFster APRN   hydroCHLOROthiazide 12.5 MG tablet TAKE 1 TABLET BY MOUTH EVERY DAY 3/29/24   Thelma Foster APRN   anastrozole (ARIMIDEX) 1 MG tablet Take  by mouth Daily.    Albert Arzate MD   Antarctic Krill Oil 500 MG capsule  9/1/23   Albert Arzate MD   Biotin 5 MG tablet Take 3 tablets by mouth.    Albert Arzate MD   COLLAGEN PO Take 10 mg by mouth Daily.    Albert Arzate MD   fluticasone (FLONASE) 50 MCG/ACT nasal spray 2 sprays by Each Nare route Daily.    Albert Arzate MD   Klor-Con M20 20 MEQ CR tablet TAKE 1 TABLET BY MOUTH EVERY DAY 4/2/24   Thelma Foster APRN   levocetirizine (XYZAL) 5 MG tablet Take 0.5 tablets by mouth Every Evening.    Albert Arzate MD   Multiple Vitamins-Calcium (ONE-A-DAY WOMENS FORMULA PO) One-A-Day Womens Formula    Albert Arzate MD   pantoprazole (PROTONIX) 40 MG EC tablet TAKE 1 TABLET BY MOUTH EVERY DAY 9/27/23   Saeed Tom MD   psyllium (METAMUCIL SMOOTH TEXTURE) 28 % packet Take  by mouth.    Albert Arzate MD       Objective     Vital Signs: /83   Pulse 86   Temp 97.7 °F (36.5 °C)   Resp 17   Ht 162.6 cm (64.02\")   Wt 88.5 kg (195 lb)   LMP  (LMP Unknown)   SpO2 97%   BMI 33.45 kg/m²   Physical Exam  Constitutional:       Appearance: She is obese.   HENT:      Head: Normocephalic and atraumatic.      Right Ear: Tympanic membrane normal.      Left Ear: Tympanic membrane normal.   Eyes:      Conjunctiva/sclera: Conjunctivae normal.      Pupils: Pupils are equal, round, and reactive to light.   Cardiovascular:      Rate and Rhythm: Normal rate and regular rhythm.      Heart sounds: Normal heart sounds.   Pulmonary:      Effort: Pulmonary effort is normal. No respiratory distress.      Breath sounds: Normal breath sounds.   Abdominal:      General: " Bowel sounds are normal. There is no distension.      Palpations: Abdomen is soft.      Tenderness: There is no abdominal tenderness.   Musculoskeletal:         General: No swelling.      Cervical back: Neck supple.   Skin:     General: Skin is warm and dry.      Findings: No rash.   Neurological:      General: No focal deficit present.      Mental Status: She is alert and oriented to person, place, and time.   Psychiatric:         Mood and Affect: Mood normal.         Behavior: Behavior normal.         Thought Content: Thought content normal.         Judgment: Judgment normal.         BMI is >= 30 and <35. (Class 1 Obesity). The following options were offered after discussion;: exercise counseling/recommendations and nutrition counseling/recommendations      Results Reviewed:  Glucose   Date Value Ref Range Status   04/22/2024 97 70 - 99 mg/dL Final   12/03/2020 91 74 - 109 mg/dL Final     BUN   Date Value Ref Range Status   04/22/2024 13 8 - 27 mg/dL Final   12/03/2020 11 6 - 20 mg/dL Final     Creatinine   Date Value Ref Range Status   04/22/2024 0.83 0.57 - 1.00 mg/dL Final   12/03/2020 0.6 0.5 - 0.9 mg/dL Final     Sodium   Date Value Ref Range Status   04/22/2024 142 134 - 144 mmol/L Final   12/03/2020 140 136 - 145 mmol/L Final     Potassium   Date Value Ref Range Status   04/22/2024 3.5 3.5 - 5.2 mmol/L Final   12/03/2020 4.0 3.5 - 5.0 mmol/L Final     Chloride   Date Value Ref Range Status   04/22/2024 101 96 - 106 mmol/L Final   12/03/2020 101 98 - 111 mmol/L Final     CO2   Date Value Ref Range Status   12/03/2020 28 22 - 29 mmol/L Final     Total CO2   Date Value Ref Range Status   04/22/2024 27 20 - 29 mmol/L Final     Calcium   Date Value Ref Range Status   04/22/2024 9.6 8.7 - 10.3 mg/dL Final   12/03/2020 9.7 8.6 - 10.0 mg/dL Final     ALT (SGPT)   Date Value Ref Range Status   04/22/2024 19 0 - 32 IU/L Final   12/03/2020 13 5 - 33 U/L Final     AST (SGOT)   Date Value Ref Range Status   04/22/2024  19 0 - 40 IU/L Final   12/03/2020 18 5 - 32 U/L Final     WBC   Date Value Ref Range Status   04/22/2024 6.0 3.4 - 10.8 x10E3/uL Final   12/03/2020 6.9 4.8 - 10.8 K/uL Final     Hematocrit   Date Value Ref Range Status   04/22/2024 43.2 34.0 - 46.6 % Final   12/03/2020 43.5 37.0 - 47.0 % Final     Platelets   Date Value Ref Range Status   04/22/2024 266 150 - 450 x10E3/uL Final   12/03/2020 264 130 - 400 K/uL Final   06/12/2019 303 150 - 450 10*3/uL Final     Triglycerides   Date Value Ref Range Status   04/22/2024 223 (H) 0 - 149 mg/dL Final   07/25/2018 63 <=150 mg/dL Final     Comment:     Desirable    <149 mg/dL  Borderline   150-199 mg/dL  Higher Risk  >200 mg/dL     HDL Cholesterol   Date Value Ref Range Status   04/22/2024 49 >39 mg/dL Final   07/25/2018 58 >=40 mg/dL Final     Comment:     Desirable    >39 mg/dL  Higher Risk  <39 mg/dL     LDL Cholesterol    Date Value Ref Range Status   07/25/2018 104 <=130 mg/dL Final     Comment:     Desirable    <100mg/dL  Borderline   100-159 mg/dL  Higher Risk  >160 mg/dL     LDL Chol Calc (NIH)   Date Value Ref Range Status   04/22/2024 109 (H) 0 - 99 mg/dL Final     Hemoglobin A1C   Date Value Ref Range Status   04/22/2024 5.7 (H) 4.8 - 5.6 % Final     Comment:              Prediabetes: 5.7 - 6.4           Diabetes: >6.4           Glycemic control for adults with diabetes: <7.0           Assessment / Plan     Assessment/Plan:  Diagnoses and all orders for this visit:    1. Routine general medical examination at a health care facility (Primary)  The patient's colonoscopy, mammogram, and bone density are current and up-to-date. The patient was advised to incorporate more fruits into her diet to manage her cholesterol levels. The patient was advised to schedule a skin survey with a dermatologist in [place].    2. Non-seasonal allergic rhinitis due to pollen  -     montelukast (Singulair) 10 MG tablet; Take 1 tablet by mouth Every Night.  Dispense: 90 tablet; Refill:  1    3. Essential (primary) hypertension   Stable.   4. Pre-diabetes   Pt is aware of diet choices Will repeat A1C in 3 months.   We reviewed her labs work today. Discussed weight management and importance of maintaining a healthy weight. Discussed Vitamin D intake and the importance of adequate vitamin D for both Bone Health and a healthy immune system.  Discussed Daily exercise and the importance of same, in regards to a healthy heart as well as helping to maintain her weight and improving her mental health.  BMI  Colonoscopy is up to date.  Mammogram is up to date.     No follow-ups on file. unless patient needs to be seen sooner or acute issues arise.    Code Status: Full.     I have discussed the patient results/orders and and plan/recommendation with them at today's visit.      Signed by:    RAYRAY Velázquez Date: 04/25/24  Transcribed from ambient dictation for RAYRAY Velázquez by Catie Robbins.  04/25/24   11:45 CDT    Patient or patient representative verbalized consent to the visit recording.  I have personally performed the services described in this document as transcribed by the above individual, and it is both accurate and complete.  RAYRAY Velázquez  4/25/2024  18:48 CDT

## 2024-09-25 DIAGNOSIS — I10 ESSENTIAL (PRIMARY) HYPERTENSION: ICD-10-CM

## 2024-09-25 RX ORDER — HYDROCHLOROTHIAZIDE 12.5 MG/1
TABLET ORAL
Qty: 90 TABLET | Refills: 1 | Status: SHIPPED | OUTPATIENT
Start: 2024-09-25

## 2024-09-25 RX ORDER — PANTOPRAZOLE SODIUM 40 MG/1
TABLET, DELAYED RELEASE ORAL
Qty: 90 TABLET | Refills: 3 | OUTPATIENT
Start: 2024-09-25

## 2024-09-25 RX ORDER — AMLODIPINE BESYLATE 5 MG/1
5 TABLET ORAL DAILY
Qty: 90 TABLET | Refills: 1 | Status: SHIPPED | OUTPATIENT
Start: 2024-09-25

## 2024-09-30 ENCOUNTER — OFFICE VISIT (OUTPATIENT)
Dept: INTERNAL MEDICINE | Facility: CLINIC | Age: 64
End: 2024-09-30
Payer: COMMERCIAL

## 2024-09-30 VITALS
DIASTOLIC BLOOD PRESSURE: 86 MMHG | OXYGEN SATURATION: 96 % | BODY MASS INDEX: 32.95 KG/M2 | TEMPERATURE: 97.7 F | HEART RATE: 76 BPM | WEIGHT: 193 LBS | RESPIRATION RATE: 18 BRPM | HEIGHT: 64 IN | SYSTOLIC BLOOD PRESSURE: 130 MMHG

## 2024-09-30 DIAGNOSIS — K22.70 BARRETT'S ESOPHAGUS WITHOUT DYSPLASIA: ICD-10-CM

## 2024-09-30 DIAGNOSIS — F32.1 MODERATE MAJOR DEPRESSION: Primary | ICD-10-CM

## 2024-09-30 PROCEDURE — 99213 OFFICE O/P EST LOW 20 MIN: CPT | Performed by: NURSE PRACTITIONER

## 2024-09-30 RX ORDER — ESCITALOPRAM OXALATE 10 MG/1
10 TABLET ORAL DAILY
Qty: 90 TABLET | Refills: 1 | Status: SHIPPED | OUTPATIENT
Start: 2024-09-30

## 2024-09-30 RX ORDER — PANTOPRAZOLE SODIUM 40 MG/1
40 TABLET, DELAYED RELEASE ORAL DAILY
Qty: 90 TABLET | Refills: 3 | Status: SHIPPED | OUTPATIENT
Start: 2024-09-30

## 2024-09-30 NOTE — PROGRESS NOTES
Answers submitted by the patient for this visit:  Other (Submitted on 9/25/2024)  Please describe your symptoms.: 3 month follow up  Have you had these symptoms before?: Yes  How long have you been having these symptoms?: Greater than 2 weeks  Primary Reason for Visit (Submitted on 9/25/2024)  What is the primary reason for your visit?: Problem Not Listed            Subjective     Chief Complaint   Patient presents with    Hypertension       History of Present Illness  The patient presents for evaluation of stress.    She is experiencing significant stress due to her 's health issues, which include high blood pressure and a consistently elevated pulse rate. Her 's physical condition has deteriorated to the point where he is unable to wear compression socks due to his thin legs. His brothers have expressed concern over his health, particularly his weight loss to 162 pounds and his refusal to eat or drink anything other than Rah Valerio. This situation has led to feelings of hatred and frustration in her, as she perceives his actions as a lack of care for both himself and her.    She reports memory issues, which she attributes to her high stress levels.    She has a history of using Lexapro for severe premenstrual syndrome (PMS), which was effective. She was prescribed this medication by Dr. James when she was also taking anastrozole. She has used Lexapro three times in the past.    She has previously sought help from a Religion counselor in [place].      Patient's PMR from outside medical facility reviewed and noted.    Otherwise complete ROS reviewed and negative except as mentioned in the HPI.    Past Medical History:   Past Medical History:   Diagnosis Date    Anemia 2019    Due to chemotherapy    Arthritis     Jean-Baptiste's esophagus     Breast cancer 2019    left breast    Drug therapy     Fatty liver disease, nonalcoholic     GERD (gastroesophageal reflux disease)     Hiatal hernia     HL (hearing  loss) 1981    Hx of radiation therapy 2019    left breast    Hypertension     Hypokalemia     Idiopathic hematuria     Kidney stones     Lipoma     Osteopenia     Renal disorder     UTI (urinary tract infection)     Visual impairment 6/2022    Vitreous detachment     Past Surgical History:  Past Surgical History:   Procedure Laterality Date    BREAST BIOPSY Left 2019    cancer    BREAST LUMPECTOMY Left 2019    COLONOSCOPY      2011?    COLONOSCOPY N/A 05/17/2022    Procedure: COLONOSCOPY WITH ANESTHESIA;  Surgeon: Saeed Tom MD;  Location: [facility] ENDOSCOPY;  Service: Gastroenterology;  Laterality: N/A;  preop; screening   postop; diverticulosis ; hemmhroids   PCP Shanelle Foster    DILATION AND CURETTAGE, DIAGNOSTIC / THERAPEUTIC      ENDOSCOPY N/A 05/17/2022    Procedure: ESOPHAGOGASTRODUODENOSCOPY WITH ANESTHESIA;  Surgeon: Saeed Tom MD;  Location: [facility] ENDOSCOPY;  Service: Gastroenterology;  Laterality: N/A;  preop; gerd  postop; esophagitis   PCP Shanelle Foster    ENDOSCOPY N/A 06/03/2022    Procedure: ESOPHAGOGASTRODUODENOSCOPY;  Surgeon: Saeed Tom MD;  Location: [facility] ENDOSCOPY;  Service: Gastroenterology;  Laterality: N/A;  preop; GERD  postop hiatal hernia  PCP Thelma Foster     ENDOSCOPY N/A 08/28/2023    Procedure: ESOPHAGOGASTRODUODENOSCOPY WITH ANESTHESIA;  Surgeon: Saeed Tom MD;  Location: [facility] ENDOSCOPY;  Service: Gastroenterology;  Laterality: N/A;  pre baarrett's, hiatal hernia  post cueto's hiatal hernia,duodenal bulb polyp  Thelma Foster    LIPOMA RESECTION Left 2007    axilla area    LYMPH NODE BIOPSY  1/31/2019    In conjunction with breast lumpectomy    OTHER SURGICAL HISTORY      rectocele    RECTOCELE REPAIR      WISDOM TOOTH EXTRACTION       Social History:  reports that she has never smoked. She has never used smokeless tobacco. She reports that she does not currently use alcohol. She reports that she does not use drugs.    Family History: family  history includes COPD in her brother; Cancer in her father; Diabetes in her brother and mother; High cholesterol in her brother; Hodgkin's lymphoma in her father; Hypertension in her brother and mother; Leukemia in her father; Lymphoma in her father; Stroke in her brother and mother; Vision loss in her maternal aunt.       Allergies:  Allergies   Allergen Reactions    Ace Inhibitors Other (See Comments), Swelling and Unknown - High Severity     lisinopril  lisinopril      Omeprazole Other (See Comments) and Unknown - High Severity     Developed swollen lips with tiny white sores    Sulfa Antibiotics Swelling and Unknown - Low Severity    Levofloxacin Myalgia and Other (See Comments)     Muscle pain      Lisinopril Angioedema     Medications:  Prior to Admission medications    Medication Sig Start Date End Date Taking? Authorizing Provider   pantoprazole (PROTONIX) 40 MG EC tablet Take 1 tablet by mouth Daily. 9/30/24  Yes Thelma Foster APRN   amLODIPine (NORVASC) 5 MG tablet TAKE 1 TABLET BY MOUTH EVERY DAY 9/25/24   Thelma Foster APRN   Antarctic Krill Oil 500 MG capsule  9/1/23   Albert Arzate MD   Biotin 5 MG tablet Take 3 tablets by mouth.    Albert Arzate MD   COLLAGEN PO Take 10 mg by mouth Daily.    Albert Arzate MD   fluticasone (FLONASE) 50 MCG/ACT nasal spray 2 sprays by Each Nare route Daily.    Albert Arzate MD   hydroCHLOROthiazide 12.5 MG tablet TAKE 1 TABLET BY MOUTH EVERY DAY 9/25/24   Thelma Foster APRN   Klor-Con M20 20 MEQ CR tablet TAKE 1 TABLET BY MOUTH EVERY DAY 4/2/24   Thelma Foster APRN   levocetirizine (XYZAL) 5 MG tablet Take 0.5 tablets by mouth Every Evening.    Albert Arzate MD   montelukast (Singulair) 10 MG tablet Take 1 tablet by mouth Every Night. 4/25/24   Thelma Foster APRN   Multiple Vitamins-Calcium (ONE-A-DAY WOMENS FORMULA PO) One-A-Day Womens Formula    Albert Arzate MD  "  psyllium (METAMUCIL SMOOTH TEXTURE) 28 % packet Take  by mouth.    Provider, MD Albert   pantoprazole (PROTONIX) 40 MG EC tablet TAKE 1 TABLET BY MOUTH EVERY DAY 9/27/23 9/30/24  Saeed Tom MD       Objective     Vital Signs: /86   Pulse 76   Temp 97.7 °F (36.5 °C)   Resp 18   Ht 162.6 cm (64\")   Wt 87.5 kg (193 lb)   LMP  (LMP Unknown)   SpO2 96%   BMI 33.13 kg/m²     Physical Exam    Physical Exam  Vitals reviewed.   Constitutional:       Appearance: She is well-developed. She is obese.   HENT:      Head: Normocephalic and atraumatic.   Eyes:      Pupils: Pupils are equal, round, and reactive to light.   Neck:      Vascular: No JVD.   Cardiovascular:      Rate and Rhythm: Normal rate and regular rhythm.   Pulmonary:      Effort: Pulmonary effort is normal.      Breath sounds: Normal breath sounds.   Abdominal:      General: Bowel sounds are normal.      Palpations: Abdomen is soft.   Musculoskeletal:         General: No deformity.      Cervical back: Normal range of motion and neck supple.   Lymphadenopathy:      Cervical: No cervical adenopathy.   Skin:     General: Skin is warm and dry.   Neurological:      Mental Status: She is alert and oriented to person, place, and time.   Psychiatric:         Behavior: Behavior normal.         Thought Content: Thought content normal.         Judgment: Judgment normal.     Results Reviewed:  Glucose   Date Value Ref Range Status   04/22/2024 97 70 - 99 mg/dL Final   12/03/2020 91 74 - 109 mg/dL Final     BUN   Date Value Ref Range Status   04/22/2024 13 8 - 27 mg/dL Final   12/03/2020 11 6 - 20 mg/dL Final     Creatinine   Date Value Ref Range Status   04/22/2024 0.83 0.57 - 1.00 mg/dL Final   12/03/2020 0.6 0.5 - 0.9 mg/dL Final     Sodium   Date Value Ref Range Status   04/22/2024 142 134 - 144 mmol/L Final   12/03/2020 140 136 - 145 mmol/L Final     Potassium   Date Value Ref Range Status   04/22/2024 3.5 3.5 - 5.2 mmol/L Final   12/03/2020 " 4.0 3.5 - 5.0 mmol/L Final     Chloride   Date Value Ref Range Status   04/22/2024 101 96 - 106 mmol/L Final   12/03/2020 101 98 - 111 mmol/L Final     CO2   Date Value Ref Range Status   12/03/2020 28 22 - 29 mmol/L Final     Total CO2   Date Value Ref Range Status   04/22/2024 27 20 - 29 mmol/L Final     Calcium   Date Value Ref Range Status   04/22/2024 9.6 8.7 - 10.3 mg/dL Final   12/03/2020 9.7 8.6 - 10.0 mg/dL Final     ALT (SGPT)   Date Value Ref Range Status   04/22/2024 19 0 - 32 IU/L Final   12/03/2020 13 5 - 33 U/L Final     AST (SGOT)   Date Value Ref Range Status   04/22/2024 19 0 - 40 IU/L Final   12/03/2020 18 5 - 32 U/L Final     WBC   Date Value Ref Range Status   04/22/2024 6.0 3.4 - 10.8 x10E3/uL Final   12/03/2020 6.9 4.8 - 10.8 K/uL Final     Hematocrit   Date Value Ref Range Status   04/22/2024 43.2 34.0 - 46.6 % Final   12/03/2020 43.5 37.0 - 47.0 % Final     Platelets   Date Value Ref Range Status   04/22/2024 266 150 - 450 x10E3/uL Final   12/03/2020 264 130 - 400 K/uL Final   06/12/2019 303 150 - 450 10*3/uL Final     Triglycerides   Date Value Ref Range Status   04/22/2024 223 (H) 0 - 149 mg/dL Final   07/25/2018 63 <=150 mg/dL Final     Comment:     Desirable    <149 mg/dL  Borderline   150-199 mg/dL  Higher Risk  >200 mg/dL     HDL Cholesterol   Date Value Ref Range Status   04/22/2024 49 >39 mg/dL Final   07/25/2018 58 >=40 mg/dL Final     Comment:     Desirable    >39 mg/dL  Higher Risk  <39 mg/dL     LDL Cholesterol    Date Value Ref Range Status   07/25/2018 104 <=130 mg/dL Final     Comment:     Desirable    <100mg/dL  Borderline   100-159 mg/dL  Higher Risk  >160 mg/dL     LDL Chol Calc (NIH)   Date Value Ref Range Status   04/22/2024 109 (H) 0 - 99 mg/dL Final     Hemoglobin A1C   Date Value Ref Range Status   04/22/2024 5.7 (H) 4.8 - 5.6 % Final     Comment:              Prediabetes: 5.7 - 6.4           Diabetes: >6.4           Glycemic control for adults with diabetes: <7.0          Results        Assessment / Plan     Assessment/Plan:  Diagnoses and all orders for this visit:    1. Moderate major depression (Primary)  -     escitalopram (Lexapro) 10 MG tablet; Take 1 tablet by mouth Daily.  Dispense: 90 tablet; Refill: 1    2. 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  Dispense: 90 tablet; Refill: 3      Assessment & Plan  1. Cognitive dysfunction related to moderate depression.  Her symptoms of stress, anger, and memory issues are indicative of cognitive dysfunction associated with moderate depression. The addition of Lexapro to her treatment regimen is expected to be beneficial. A prescription for Lexapro 10 mg has been provided. She has been advised to seek counseling and to inform if there is a need to adjust her medication dosage.    2. Medication Management.  A prescription for Lexapro 10 mg has been provided. The prescription for her medication will be sent to [facility].    Follow-up  Return in 3 months for follow up.      No follow-ups on file. unless patient needs to be seen sooner or acute issues arise.    Code Status: Full.   Patient or patient representative verbalized consent for the use of Ambient Listening during the visit with  RAYRAY Velázquez for chart documentation. 9/30/2024  14:26 CDT  I have discussed the patient results/orders and and plan/recommendation with them at today's visit.      Signed by:    RAYRAY Velázquez Date: 09/30/24

## 2024-10-06 ENCOUNTER — APPOINTMENT (OUTPATIENT)
Dept: CT IMAGING | Facility: HOSPITAL | Age: 64
End: 2024-10-06
Payer: COMMERCIAL

## 2024-10-06 ENCOUNTER — APPOINTMENT (OUTPATIENT)
Dept: GENERAL RADIOLOGY | Facility: HOSPITAL | Age: 64
End: 2024-10-06
Payer: COMMERCIAL

## 2024-10-06 ENCOUNTER — HOSPITAL ENCOUNTER (EMERGENCY)
Facility: HOSPITAL | Age: 64
Discharge: HOME | End: 2024-10-06
Attending: EMERGENCY MEDICINE | Admitting: EMERGENCY MEDICINE
Payer: COMMERCIAL

## 2024-10-06 VITALS
BODY MASS INDEX: 32.93 KG/M2 | TEMPERATURE: 97.8 F | SYSTOLIC BLOOD PRESSURE: 115 MMHG | HEART RATE: 75 BPM | RESPIRATION RATE: 14 BRPM | WEIGHT: 192.9 LBS | OXYGEN SATURATION: 98 % | HEIGHT: 64 IN | DIASTOLIC BLOOD PRESSURE: 79 MMHG

## 2024-10-06 DIAGNOSIS — R07.9 CHEST PAIN, UNSPECIFIED TYPE: Primary | ICD-10-CM

## 2024-10-06 DIAGNOSIS — E87.6 HYPOKALEMIA: ICD-10-CM

## 2024-10-06 DIAGNOSIS — R91.1 PULMONARY NODULE: ICD-10-CM

## 2024-10-06 LAB
ALBUMIN SERPL-MCNC: 4.9 G/DL (ref 3.5–5.2)
ALBUMIN/GLOB SERPL: 1.5 G/DL
ALP SERPL-CCNC: 165 U/L (ref 39–117)
ALT SERPL W P-5'-P-CCNC: 25 U/L (ref 1–33)
ANION GAP SERPL CALCULATED.3IONS-SCNC: 16 MMOL/L (ref 5–15)
AST SERPL-CCNC: 28 U/L (ref 1–32)
BASOPHILS # BLD AUTO: 0.06 10*3/MM3 (ref 0–0.2)
BASOPHILS NFR BLD AUTO: 0.7 % (ref 0–1.5)
BILIRUB SERPL-MCNC: 0.6 MG/DL (ref 0–1.2)
BUN SERPL-MCNC: 9 MG/DL (ref 8–23)
BUN/CREAT SERPL: 12 (ref 7–25)
CALCIUM SPEC-SCNC: 9.7 MG/DL (ref 8.6–10.5)
CHLORIDE SERPL-SCNC: 96 MMOL/L (ref 98–107)
CO2 SERPL-SCNC: 27 MMOL/L (ref 22–29)
CREAT SERPL-MCNC: 0.75 MG/DL (ref 0.57–1)
D DIMER PPP FEU-MCNC: 0.29 MCGFEU/ML (ref 0–0.63)
DEPRECATED RDW RBC AUTO: 39 FL (ref 37–54)
EGFRCR SERPLBLD CKD-EPI 2021: 89.6 ML/MIN/1.73
EOSINOPHIL # BLD AUTO: 0.01 10*3/MM3 (ref 0–0.4)
EOSINOPHIL NFR BLD AUTO: 0.1 % (ref 0.3–6.2)
ERYTHROCYTE [DISTWIDTH] IN BLOOD BY AUTOMATED COUNT: 13.1 % (ref 12.3–15.4)
GEN 5 2HR TROPONIN T REFLEX: <6 NG/L
GLOBULIN UR ELPH-MCNC: 3.3 GM/DL
GLUCOSE SERPL-MCNC: 143 MG/DL (ref 65–99)
HCT VFR BLD AUTO: 46.4 % (ref 34–46.6)
HGB BLD-MCNC: 15.5 G/DL (ref 12–15.9)
HOLD SPECIMEN: NORMAL
HOLD SPECIMEN: NORMAL
IMM GRANULOCYTES # BLD AUTO: 0.04 10*3/MM3 (ref 0–0.05)
IMM GRANULOCYTES NFR BLD AUTO: 0.5 % (ref 0–0.5)
LYMPHOCYTES # BLD AUTO: 1.11 10*3/MM3 (ref 0.7–3.1)
LYMPHOCYTES NFR BLD AUTO: 12.6 % (ref 19.6–45.3)
MAGNESIUM SERPL-MCNC: 2.1 MG/DL (ref 1.6–2.4)
MCH RBC QN AUTO: 27.7 PG (ref 26.6–33)
MCHC RBC AUTO-ENTMCNC: 33.4 G/DL (ref 31.5–35.7)
MCV RBC AUTO: 82.9 FL (ref 79–97)
MONOCYTES # BLD AUTO: 0.21 10*3/MM3 (ref 0.1–0.9)
MONOCYTES NFR BLD AUTO: 2.4 % (ref 5–12)
NEUTROPHILS NFR BLD AUTO: 7.36 10*3/MM3 (ref 1.7–7)
NEUTROPHILS NFR BLD AUTO: 83.7 % (ref 42.7–76)
NRBC BLD AUTO-RTO: 0 /100 WBC (ref 0–0.2)
NT-PROBNP SERPL-MCNC: 152 PG/ML (ref 0–900)
PLATELET # BLD AUTO: 298 10*3/MM3 (ref 140–450)
PMV BLD AUTO: 9.4 FL (ref 6–12)
POTASSIUM SERPL-SCNC: 3.2 MMOL/L (ref 3.5–5.2)
PROT SERPL-MCNC: 8.2 G/DL (ref 6–8.5)
RBC # BLD AUTO: 5.6 10*6/MM3 (ref 3.77–5.28)
SODIUM SERPL-SCNC: 139 MMOL/L (ref 136–145)
TROPONIN T DELTA: NORMAL
TROPONIN T SERPL HS-MCNC: <6 NG/L
WBC NRBC COR # BLD AUTO: 8.79 10*3/MM3 (ref 3.4–10.8)
WHOLE BLOOD HOLD COAG: NORMAL
WHOLE BLOOD HOLD SPECIMEN: NORMAL

## 2024-10-06 PROCEDURE — 93010 ELECTROCARDIOGRAM REPORT: CPT | Performed by: EMERGENCY MEDICINE

## 2024-10-06 PROCEDURE — 71275 CT ANGIOGRAPHY CHEST: CPT

## 2024-10-06 PROCEDURE — 83880 ASSAY OF NATRIURETIC PEPTIDE: CPT | Performed by: EMERGENCY MEDICINE

## 2024-10-06 PROCEDURE — 84484 ASSAY OF TROPONIN QUANT: CPT | Performed by: EMERGENCY MEDICINE

## 2024-10-06 PROCEDURE — 80053 COMPREHEN METABOLIC PANEL: CPT | Performed by: EMERGENCY MEDICINE

## 2024-10-06 PROCEDURE — 83735 ASSAY OF MAGNESIUM: CPT | Performed by: EMERGENCY MEDICINE

## 2024-10-06 PROCEDURE — 93005 ELECTROCARDIOGRAM TRACING: CPT | Performed by: EMERGENCY MEDICINE

## 2024-10-06 PROCEDURE — 85379 FIBRIN DEGRADATION QUANT: CPT | Performed by: EMERGENCY MEDICINE

## 2024-10-06 PROCEDURE — 93005 ELECTROCARDIOGRAM TRACING: CPT

## 2024-10-06 PROCEDURE — 85025 COMPLETE CBC W/AUTO DIFF WBC: CPT | Performed by: EMERGENCY MEDICINE

## 2024-10-06 PROCEDURE — 25510000001 IOPAMIDOL PER 1 ML: Performed by: EMERGENCY MEDICINE

## 2024-10-06 PROCEDURE — 36415 COLL VENOUS BLD VENIPUNCTURE: CPT

## 2024-10-06 PROCEDURE — 71045 X-RAY EXAM CHEST 1 VIEW: CPT

## 2024-10-06 PROCEDURE — 99285 EMERGENCY DEPT VISIT HI MDM: CPT

## 2024-10-06 RX ORDER — SODIUM CHLORIDE 0.9 % (FLUSH) 0.9 %
10 SYRINGE (ML) INJECTION AS NEEDED
Status: DISCONTINUED | OUTPATIENT
Start: 2024-10-06 | End: 2024-10-06 | Stop reason: HOSPADM

## 2024-10-06 RX ORDER — ASPIRIN 325 MG
325 TABLET, DELAYED RELEASE (ENTERIC COATED) ORAL DAILY
Qty: 20 TABLET | Refills: 0 | Status: SHIPPED | OUTPATIENT
Start: 2024-10-06 | End: 2024-10-26

## 2024-10-06 RX ORDER — IOPAMIDOL 755 MG/ML
100 INJECTION, SOLUTION INTRAVASCULAR
Status: COMPLETED | OUTPATIENT
Start: 2024-10-06 | End: 2024-10-06

## 2024-10-06 RX ORDER — ASPIRIN 81 MG/1
324 TABLET, CHEWABLE ORAL ONCE
Status: COMPLETED | OUTPATIENT
Start: 2024-10-06 | End: 2024-10-06

## 2024-10-06 RX ORDER — NITROGLYCERIN 0.4 MG/1
0.4 TABLET SUBLINGUAL
Qty: 20 TABLET | Refills: 0 | Status: SHIPPED | OUTPATIENT
Start: 2024-10-06

## 2024-10-06 RX ORDER — POTASSIUM CHLORIDE 1500 MG/1
20 TABLET, EXTENDED RELEASE ORAL 2 TIMES DAILY
Qty: 6 TABLET | Refills: 0 | Status: SHIPPED | OUTPATIENT
Start: 2024-10-06 | End: 2024-10-09

## 2024-10-06 RX ADMIN — ASPIRIN 324 MG: 81 TABLET, CHEWABLE ORAL at 11:52

## 2024-10-06 RX ADMIN — IOPAMIDOL 100 ML: 755 INJECTION, SOLUTION INTRAVENOUS at 12:44

## 2024-10-06 NOTE — ED PROVIDER NOTES
Subjective   History of Present Illness  Patient with chest pain and chest pressure going on for the past couple of days getting worse came to the ED for evaluation history of breast cancer in the past.  Well score cannot rule out completely pulmonary embolus in this patient with a history of cancer in the past and a heart rate of 102 bpm.    Chest Pain  Pain location:  Substernal area, L chest and R chest  Pain quality: aching and pressure    Pain radiates to:  Does not radiate  Pain severity:  Moderate  Onset quality:  Gradual  Timing:  Intermittent  Chronicity:  New  Context: not breathing, not drug use, not lifting, not movement, not at rest and not trauma    Relieved by:  Nothing  Worsened by:  Nothing  Ineffective treatments:  None tried  Associated symptoms: nausea    Associated symptoms: no abdominal pain, no anorexia, no anxiety, no back pain, no claudication, no cough, no fatigue, no fever, no headache, no orthopnea, no palpitations, no PND, no vomiting and no weakness    Risk factors: hypertension    Risk factors: no aortic disease, not male, not obese and no prior DVT/PE        Review of Systems   Constitutional: Negative.  Negative for fatigue and fever.   HENT: Negative.     Eyes: Negative.    Respiratory: Negative.  Negative for cough.    Cardiovascular:  Positive for chest pain. Negative for palpitations, orthopnea, claudication and PND.   Gastrointestinal:  Positive for nausea. Negative for abdominal pain, anorexia and vomiting.   Musculoskeletal: Negative.  Negative for back pain and neck pain.   Skin: Negative.    Neurological: Negative.  Negative for weakness and headaches.   All other systems reviewed and are negative.      Past Medical History:   Diagnosis Date    Anemia 2019    Due to chemotherapy    Arthritis     Jean-Baptiste's esophagus     Breast cancer 2019    left breast    Drug therapy     Fatty liver disease, nonalcoholic     GERD (gastroesophageal reflux disease)     Hiatal hernia     HL  (hearing loss) 1981    Hx of radiation therapy 2019    left breast    Hypertension     Hypokalemia     Idiopathic hematuria     Kidney stones     Lipoma     Osteopenia     Renal disorder     UTI (urinary tract infection)     Visual impairment 6/2022    Vitreous detachment       Allergies   Allergen Reactions    Ace Inhibitors Other (See Comments), Swelling and Unknown - High Severity     lisinopril  lisinopril      Omeprazole Other (See Comments) and Unknown - High Severity     Developed swollen lips with tiny white sores    Sulfa Antibiotics Swelling and Unknown - Low Severity    Levofloxacin Myalgia and Other (See Comments)     Muscle pain      Lisinopril Angioedema       Past Surgical History:   Procedure Laterality Date    BREAST BIOPSY Left 2019    cancer    BREAST LUMPECTOMY Left 2019    COLONOSCOPY      2011?    COLONOSCOPY N/A 05/17/2022    Procedure: COLONOSCOPY WITH ANESTHESIA;  Surgeon: Saeed Tom MD;  Location: [facility] ENDOSCOPY;  Service: Gastroenterology;  Laterality: N/A;  preop; screening   postop; diverticulosis ; hemmhroids   PCP Shanelle Foster    DILATION AND CURETTAGE, DIAGNOSTIC / THERAPEUTIC      ENDOSCOPY N/A 05/17/2022    Procedure: ESOPHAGOGASTRODUODENOSCOPY WITH ANESTHESIA;  Surgeon: Saeed Tom MD;  Location: [facility] ENDOSCOPY;  Service: Gastroenterology;  Laterality: N/A;  preop; gerd  postop; esophagitis   PCP Shanelle Foster    ENDOSCOPY N/A 06/03/2022    Procedure: ESOPHAGOGASTRODUODENOSCOPY;  Surgeon: Saeed Tom MD;  Location: [facility] ENDOSCOPY;  Service: Gastroenterology;  Laterality: N/A;  preop; GERD  postop hiatal hernia  PCP Thelma Foster     ENDOSCOPY N/A 08/28/2023    Procedure: ESOPHAGOGASTRODUODENOSCOPY WITH ANESTHESIA;  Surgeon: Saeed Tom MD;  Location: [facility] ENDOSCOPY;  Service: Gastroenterology;  Laterality: N/A;  pre baarrett's, hiatal hernia  post cueto's hiatal hernia,duodenal bulb polyp  Thelma Foster    LIPOMA RESECTION Left 2007     axilla area    LYMPH NODE BIOPSY  1/31/2019    In conjunction with breast lumpectomy    OTHER SURGICAL HISTORY      rectocele    RECTOCELE REPAIR      WISDOM TOOTH EXTRACTION         Family History   Problem Relation Age of Onset    Leukemia Father     Lymphoma Father     Hodgkin's lymphoma Father     Cancer Father         Lymphoma and CLL    Hypertension Mother     Diabetes Mother         Type 2    Stroke Mother     High cholesterol Brother     Hypertension Brother     Diabetes Brother     COPD Brother         He smoked for years.    Stroke Brother         TIA    Vision loss Maternal Aunt         Glaucoma    Colon cancer Neg Hx     Colon polyps Neg Hx     Ovarian cancer Neg Hx     Breast cancer Neg Hx     Uterine cancer Neg Hx     Melanoma Neg Hx     Prostate cancer Neg Hx        Social History     Socioeconomic History    Marital status:    Tobacco Use    Smoking status: Never    Smokeless tobacco: Never   Vaping Use    Vaping status: Never Used   Substance and Sexual Activity    Alcohol use: Not Currently     Comment: rare    Drug use: Never    Sexual activity: Yes     Partners: Male     Birth control/protection: Post-menopausal     Comment: Birth control pills for 20+ years           Objective   Physical Exam  Vitals and nursing note reviewed. Exam conducted with a chaperone present.   Constitutional:       General: She is not in acute distress.     Appearance: Normal appearance. She is well-developed. She is not toxic-appearing or diaphoretic.   HENT:      Head: Normocephalic and atraumatic.      Right Ear: External ear normal.      Nose: Nose normal.      Mouth/Throat:      Mouth: Mucous membranes are moist.   Eyes:      Conjunctiva/sclera: Conjunctivae normal.      Pupils: Pupils are equal, round, and reactive to light.   Cardiovascular:      Rate and Rhythm: Normal rate and regular rhythm.      Chest Wall: PMI is not displaced.      Pulses: Normal pulses. No decreased pulses.      Heart sounds: Normal  heart sounds. No murmur heard.  Pulmonary:      Effort: Pulmonary effort is normal. No tachypnea, accessory muscle usage or respiratory distress.      Breath sounds: Normal breath sounds. No stridor. No decreased breath sounds, wheezing, rhonchi or rales.   Chest:      Chest wall: No tenderness or crepitus.   Abdominal:      General: Bowel sounds are normal. There is no distension.      Palpations: Abdomen is soft.      Tenderness: There is no abdominal tenderness.   Musculoskeletal:         General: No swelling or tenderness. Normal range of motion.      Cervical back: Normal range of motion and neck supple. No rigidity.      Comments: Lower extremity exam bilaterally is unremarkable.  There is no right or left calf tenderness .  There is no palpable venous cord.  No obvious difference in the size of the legs.  No pitting edema.  The dorsalis pedis and posterior tibial femoral and popliteal pulses are palpable and +2 bilaterally.  Homans sign is negative   Skin:     General: Skin is warm.      Capillary Refill: Capillary refill takes less than 2 seconds.      Coloration: Skin is not jaundiced.      Findings: No erythema or rash.   Neurological:      General: No focal deficit present.      Mental Status: She is alert and oriented to person, place, and time. Mental status is at baseline.      Cranial Nerves: No cranial nerve deficit.      Motor: No weakness.      Coordination: Coordination normal.      Deep Tendon Reflexes: Reflexes are normal and symmetric. Reflexes normal.   Psychiatric:         Mood and Affect: Mood normal.         Procedures           ED Course  ED Course as of 10/06/24 1601   Sun Oct 06, 2024   1325 No pulmonary embolism.  2. No acute lung disease.  3. Indeterminate though benign-appearing 6 mm LEFT lower lobe lung  nodule. 6-month noncontrast chest CT follow-up recommended.  4. RIGHT and LEFT paraspinal cysts noted at the T5-T6 and T10-T11  levels.  The symptoms are discussed with the patient  and reason for outpatient valuation was discussed [TS]   1544 The abnormal CT findings which were incidental have been discussed with the patient. [TS]   1548 Had extensive discussion with the patient regarding the chest discomfort and further workup and evaluation and offered her to stay in the hospital or in the ED with a stress test in the morning versus discharging her home and having a stress test.  I have discussed this case at length with the patient and she does not want to stay in the hospital wants to go home we will order a stress as an outpatient on the patient and have her follow-up with the primary MD and return the ER for any worsening symptoms. [TS]   1551 Patient knows about  paraspinal cyst and is going to follow-up [TS]   1552 Patient originally had mild tachycardia which probably because anxiety her workup essentially is negative.  And at this time she denies any pain awake alert oriented x 3 in no distress. [TS]      ED Course User Index  [TS] Rudy Kaufman MD                HEART Score: 3                              Medical Decision Making  Differential Diagnosis:  I considered chest wall pain, muscle strain, costochondritis, pleurisy, rib fracture, herpes zoster, cardiovascular etiology, myocardial infarction, intermediate coronary syndrome, unstable angina, angina, aortic dissection, pericarditis, pulmonary etiology, pulmonary embolism, pneumonia, pneumothorax, lung cancer, gastroesophageal reflux disease, esophagitis, esophageal spasm and gastrointestinal etiology as a possible cause of chest pain in this patient. This is a partial list of diagnoses considered.        Problems Addressed:  Chest pain, unspecified type: acute illness or injury  Hypokalemia: acute illness or injury  Pulmonary nodule: undiagnosed new problem with uncertain prognosis    Amount and/or Complexity of Data Reviewed  Labs: ordered.     Details: Labs reviewed  Radiology: ordered.     Details: X-rays reviewed CAT  scans reviewed  ECG/medicine tests: ordered.     Details: Nonischemic EKG    Risk  OTC drugs.  Prescription drug management.  Risk Details: Well score 3  Heart score 3  This patient presents with chest pain , patient arrived hemodynamically stable was placed on the monitor and IV access obtained EKG was obtained and did not reveal any malignant/unstable dysrhythmias any acute ST elevation, no evidence of Brugada, or significantly prolonged QT .  Presentation not consistent with other acute, emergent cause of chest pain at this time.  100 suspicion for acute PE is low risk per Wells and Years criteria and no evidence of DVT such as calf swelling, tenderness, palpable tortuous lower extremity vein, or Homans' sign CT of the chest is negative.  Low suspicion for pneumothorax as the patient is saturating well and has no radiographic evidence of a pneumothorax.  Low suspicion for dissection there is no widened mediastinum, hypotension, pulses deficit, and no tearing back/abdominal pain.  Low suspicion for tamponade as there is no JVD, muffled heart sounds, electrical alternans on EKG, and no hypotension.  Low suspicion for pericarditis as there is no diffuse ST elevation or MI depression and the patient is afebrile.  No evidence of GI bleed per patient's history.  Low suspicion for CHF exacerbation as there is no evidence of volume overload and no evidence of severely elevated BNP.  Low suspicion for pneumonia since the patient has no fever no productive cough no chest x-ray findings of pneumonia and no leukocytosis.         Final diagnoses:   Chest pain, unspecified type   Pulmonary nodule   Hypokalemia       ED Disposition  ED Disposition       ED Disposition   Discharge    Condition   Stable    Comment   --               Thelma Foster, APRN  [address]  [place] [id number] [phone]    In 2 days           Medication List        New Prescriptions      aspirin 325 MG EC tablet  Take 1 tablet by mouth Daily  for 20 days.     nitroglycerin 0.4 MG SL tablet  Commonly known as: NITROSTAT  Place 1 tablet under the tongue Every 5 (Five) Minutes As Needed for Chest Pain for up to 20 doses. Take no more than 3 doses in 15 minutes.            Changed      * Klor-Con M20 20 MEQ CR tablet  Generic drug: potassium chloride  TAKE 1 TABLET BY MOUTH EVERY DAY  What changed: Another medication with the same name was added. Make sure you understand how and when to take each.     * potassium chloride 20 MEQ CR tablet  Commonly known as: KLOR-CON M20  Take 1 tablet by mouth 2 (Two) Times a Day for 3 days.  What changed: You were already taking a medication with the same name, and this prescription was added. Make sure you understand how and when to take each.           * This list has 2 medication(s) that are the same as other medications prescribed for you. Read the directions carefully, and ask your doctor or other care provider to review them with you.                   Where to Get Your Medications        These medications were sent to [facility]/pharmacy #[id number] - [place], [place] - [address] [place] [address] [phone] [facility] [phone] [address] [address]      Phone: [phone]   aspirin 325 MG EC tablet  nitroglycerin 0.4 MG SL tablet  potassium chloride 20 MEQ CR tablet            Rudy Kaufman MD  10/06/24 1116       Rudy Kaufman MD  10/06/24 9113

## 2024-10-06 NOTE — DISCHARGE INSTRUCTIONS
It was very nice to meet you,Telma . Thank you for allowing us to take care of you today at [facility].     Today you were seen in the emergency department for your symptoms. Please understand that an ER evaluation is just the start of your evaluation. We do the best we can, but we are often unable to fully find what is causing your symptoms from one evaluation.  Because of this, the goal is to determine whether you need to be evaluated in the hospital or if it is safe for you to go home and see other doctors provided such as primary care physicians or specialist on an outpatient basis.      Like we discussed, I strongly urge that you follow up with your primary care doctor. Please call their office to set up an appointment as soon as possible so that you can be re-evaluated for improvement in your symptoms or for any other questions.  I have provided the information needed, including phone number, to call to set up an appointment below in these discharge papers.      Educational material has also been provided in the following pages regarding what we have discussed today.  I have ordered a stress test for you as an outpatient.  Please take an aspirin every day and follow the primary MD and return there for any worsening symptoms.     Please return to the emergency room within 12-48 hours if you experience symptoms such as the following:   Fever, chills, chest pain or shortness of breath, pain with inspiration/expiration, pain that travels to your arms, neck or back, nausea, vomiting, severe headache, tearing pain in your chest, dizziness, feel as though you are about to pass out, OR if you have any worsening symptoms, or any other concerns.

## 2024-10-07 ENCOUNTER — TELEPHONE (OUTPATIENT)
Dept: INTERNAL MEDICINE | Facility: CLINIC | Age: 64
End: 2024-10-07
Payer: COMMERCIAL

## 2024-10-07 LAB
QT INTERVAL: 372 MS
QT INTERVAL: 386 MS
QTC INTERVAL: 434 MS
QTC INTERVAL: 467 MS

## 2024-10-07 NOTE — TELEPHONE ENCOUNTER
Caller: Telma Herrera    Relationship: Self    Best call back number: [phone]    Who are you requesting to speak with (clinical staff, provider,  specific staff member): CLINICAL STAFF    What was the call regarding: PATIENT IS REQUESTING A CALLBACK FROM LADARIUS KAUR TO DISCUSS HER RECENT ER VISIT.

## 2024-10-07 NOTE — TELEPHONE ENCOUNTER
Called patient and she stated you put her on Lexapro for stress. She stated she had been having tightness in chest and tingling in arms a few days before starting medication. Symptoms got worse so she went to ER yesterday, and ruled out heart attack, but are ordering stress test for patient. She thinks Lexapro is making her experience flu like symptoms: nausea, diarrhea, and headache. Do you want her to stop or cut back on medication?

## 2024-10-11 ENCOUNTER — HOSPITAL ENCOUNTER (OUTPATIENT)
Dept: CARDIOLOGY | Facility: HOSPITAL | Age: 64
Discharge: HOME | End: 2024-10-11
Admitting: EMERGENCY MEDICINE
Payer: COMMERCIAL

## 2024-10-11 VITALS
BODY MASS INDEX: 31.76 KG/M2 | WEIGHT: 186 LBS | HEART RATE: 99 BPM | HEIGHT: 64 IN | SYSTOLIC BLOOD PRESSURE: 118 MMHG | DIASTOLIC BLOOD PRESSURE: 90 MMHG

## 2024-10-11 DIAGNOSIS — R07.9 CHEST PAIN, UNSPECIFIED TYPE: ICD-10-CM

## 2024-10-11 PROCEDURE — 93350 STRESS TTE ONLY: CPT

## 2024-10-11 PROCEDURE — 93017 CV STRESS TEST TRACING ONLY: CPT

## 2024-10-11 PROCEDURE — 25510000001 PERFLUTREN 6.52 MG/ML SUSPENSION: Performed by: INTERNAL MEDICINE

## 2024-10-11 RX ADMIN — PERFLUTREN 8.48 MG: 6.52 INJECTION, SUSPENSION INTRAVENOUS at 11:00

## 2024-10-14 ENCOUNTER — TELEPHONE (OUTPATIENT)
Dept: EMERGENCY DEPT | Facility: HOSPITAL | Age: 64
End: 2024-10-14
Payer: COMMERCIAL

## 2024-10-14 LAB
BH CV STRESS BP STAGE 1: NORMAL
BH CV STRESS BP STAGE 2: NORMAL
BH CV STRESS DURATION MIN STAGE 1: 3
BH CV STRESS DURATION MIN STAGE 2: 3
BH CV STRESS DURATION SEC STAGE 1: 0
BH CV STRESS DURATION SEC STAGE 2: 0
BH CV STRESS ECHO POST STRESS EJECTION FRACTION EF: 80 %
BH CV STRESS GRADE STAGE 1: 10
BH CV STRESS GRADE STAGE 2: 12
BH CV STRESS HR STAGE 1: 144
BH CV STRESS HR STAGE 2: 164
BH CV STRESS METS STAGE 1: 5
BH CV STRESS METS STAGE 2: 7.5
BH CV STRESS PROTOCOL 1: NORMAL
BH CV STRESS RECOVERY BP: NORMAL MMHG
BH CV STRESS RECOVERY HR: 99 BPM
BH CV STRESS SPEED STAGE 1: 1.7
BH CV STRESS SPEED STAGE 2: 2.5
BH CV STRESS STAGE 1: 1
BH CV STRESS STAGE 2: 2
MAXIMAL PREDICTED HEART RATE: 157 BPM
PERCENT MAX PREDICTED HR: 104.46 %
STRESS BASELINE BP: NORMAL MMHG
STRESS BASELINE HR: 99 BPM
STRESS PERCENT HR: 123 %
STRESS POST ESTIMATED WORKLOAD: 7.5 METS
STRESS POST EXERCISE DUR MIN: 6 MIN
STRESS POST PEAK BP: NORMAL MMHG
STRESS POST PEAK HR: 164 BPM
STRESS TARGET HR: 133 BPM

## 2024-12-26 RX ORDER — POTASSIUM CHLORIDE 1500 MG/1
TABLET, EXTENDED RELEASE ORAL
Qty: 90 TABLET | Refills: 2 | Status: SHIPPED | OUTPATIENT
Start: 2024-12-26

## 2024-12-30 ENCOUNTER — OFFICE VISIT (OUTPATIENT)
Dept: INTERNAL MEDICINE | Facility: CLINIC | Age: 64
End: 2024-12-30
Payer: COMMERCIAL

## 2024-12-30 VITALS
HEIGHT: 64 IN | DIASTOLIC BLOOD PRESSURE: 86 MMHG | SYSTOLIC BLOOD PRESSURE: 134 MMHG | BODY MASS INDEX: 32.1 KG/M2 | RESPIRATION RATE: 18 BRPM | WEIGHT: 188 LBS | HEART RATE: 71 BPM | TEMPERATURE: 98.4 F | OXYGEN SATURATION: 95 %

## 2024-12-30 DIAGNOSIS — J02.9 SORE THROAT: ICD-10-CM

## 2024-12-30 DIAGNOSIS — J02.9 PHARYNGITIS, UNSPECIFIED ETIOLOGY: Primary | ICD-10-CM

## 2024-12-30 DIAGNOSIS — F32.1 MODERATE MAJOR DEPRESSION: ICD-10-CM

## 2024-12-30 DIAGNOSIS — R91.1 LUNG NODULE, SOLITARY: ICD-10-CM

## 2024-12-30 LAB
EXPIRATION DATE: NORMAL
INTERNAL CONTROL: NORMAL
Lab: NORMAL
S PYO AG THROAT QL: NEGATIVE

## 2024-12-30 PROCEDURE — 87880 STREP A ASSAY W/OPTIC: CPT | Performed by: NURSE PRACTITIONER

## 2024-12-30 PROCEDURE — 99214 OFFICE O/P EST MOD 30 MIN: CPT | Performed by: NURSE PRACTITIONER

## 2024-12-30 RX ORDER — METHYLPREDNISOLONE 4 MG/1
TABLET ORAL
Qty: 21 TABLET | Refills: 0 | Status: SHIPPED | OUTPATIENT
Start: 2024-12-30

## 2024-12-30 NOTE — PROGRESS NOTES
Answers submitted by the patient for this visit:  Primary Reason for Visit (Submitted on 12/29/2024)  What is the primary reason for your visit?: High Blood Pressure  High Blood Pressure Questionnaire (Submitted on 12/29/2024)  Chief Complaint: Hypertension  Chronicity: chronic  Onset: more than 1 year ago  Progression since onset: stable  Condition status: controlled  anxiety: No  blurred vision: No  chest pain: No  headaches: No  malaise/fatigue: No  orthopnea: No  palpitations: No  peripheral edema: No  shortness of breath: No  Agents associated with hypertension: no associated agents  CAD risks: obesity, post-menopausal state, stress  Compliance problems: exercise          Subjective     Chief Complaint   Patient presents with    Depression    Hypertension    Sore Throat       History of Present Illness  The patient is a 64-year-old female who presents for evaluation of sore throat, lung nodule, and stress.    She reports experiencing a sore throat, which she attributes to recent contact with her grandchildren from [place]. She describes the sensation as unusual but reports no chest discomfort. She also notes the presence of drainage and an enlarged uvula upon awakening. She does not have a fever or any chest pain at present.    She expresses concern about a nodule detected in her left lung during a stress echo performed on 10/10/2023, despite the overall negative results. She has never been a smoker. Her anxiety is heightened due to her history of left-sided breast cancer and subsequent radiation therapy.    She has been experiencing loose stools and diarrhea since starting Lexapro, which she believes may be stress-related. She is currently taking a reduced dose of 5 mg and is considering discontinuing the medication due to a perceived decrease in her stress levels.    SOCIAL HISTORY  She has never been a smoker.    FAMILY HISTORY  Her little brother has a lung nodule.    MEDICATIONS  Current:  Lexapro    IMMUNIZATIONS  She has not received the influenza vaccine.      Patient's PMR from outside medical facility reviewed and noted.      Otherwise complete ROS reviewed and negative except as mentioned in the HPI.    Past Medical History:   Past Medical History:   Diagnosis Date    Anemia 2019    Due to chemotherapy    Arthritis     Jean-Baptiste's esophagus     Breast cancer 2019    left breast    Drug therapy     Fatty liver disease, nonalcoholic     GERD (gastroesophageal reflux disease)     Hiatal hernia     HL (hearing loss) 1981    Hx of radiation therapy 2019    left breast    Hypertension     Hypokalemia     Idiopathic hematuria     Kidney stones     Lipoma     Osteopenia     Renal disorder     UTI (urinary tract infection)     Visual impairment 6/2022    Vitreous detachment     Past Surgical History:  Past Surgical History:   Procedure Laterality Date    BREAST BIOPSY Left 2019    cancer    BREAST LUMPECTOMY Left 2019    COLONOSCOPY      2011?    COLONOSCOPY N/A 05/17/2022    Procedure: COLONOSCOPY WITH ANESTHESIA;  Surgeon: Saeed Tom MD;  Location: [facility] ENDOSCOPY;  Service: Gastroenterology;  Laterality: N/A;  preop; screening   postop; diverticulosis ; hemmhroids   PCP Shanelle Foster    DILATION AND CURETTAGE, DIAGNOSTIC / THERAPEUTIC      ENDOSCOPY N/A 05/17/2022    Procedure: ESOPHAGOGASTRODUODENOSCOPY WITH ANESTHESIA;  Surgeon: Saeed Tom MD;  Location: [facility] ENDOSCOPY;  Service: Gastroenterology;  Laterality: N/A;  preop; gerd  postop; esophagitis   PCP Shanelle Foster    ENDOSCOPY N/A 06/03/2022    Procedure: ESOPHAGOGASTRODUODENOSCOPY;  Surgeon: Saeed Tom MD;  Location: [facility] ENDOSCOPY;  Service: Gastroenterology;  Laterality: N/A;  preop; GERD  postop hiatal hernia  PCP Thelma Foster     ENDOSCOPY N/A 08/28/2023    Procedure: ESOPHAGOGASTRODUODENOSCOPY WITH ANESTHESIA;  Surgeon: Saeed Tom MD;  Location: [facility] ENDOSCOPY;  Service: Gastroenterology;  Laterality:  N/A;  pre baarrett's, hiatal hernia  post cueto's hiatal hernia,duodenal bulb polyp  Thelma Kristin    LIPOMA RESECTION Left 2007    axilla area    LYMPH NODE BIOPSY  1/31/2019    In conjunction with breast lumpectomy    OTHER SURGICAL HISTORY      rectocele    RECTOCELE REPAIR      WISDOM TOOTH EXTRACTION       Social History:  reports that she has never smoked. She has never used smokeless tobacco. She reports that she does not currently use alcohol. She reports that she does not use drugs.    Family History: family history includes COPD in her brother; Cancer in her father; Diabetes in her brother and mother; High cholesterol in her brother; Hodgkin's lymphoma in her father; Hypertension in her brother and mother; Leukemia in her father; Lymphoma in her father; Stroke in her brother and mother; Vision loss in her maternal aunt.       Allergies:  Allergies   Allergen Reactions    Ace Inhibitors Other (See Comments), Swelling and Unknown - High Severity     lisinopril  lisinopril      Omeprazole Other (See Comments) and Unknown - High Severity     Developed swollen lips with tiny white sores    Sulfa Antibiotics Swelling and Unknown - Low Severity    Levofloxacin Myalgia and Other (See Comments)     Muscle pain      Lisinopril Angioedema     Medications:  Prior to Admission medications    Medication Sig Start Date End Date Taking? Authorizing Provider   amLODIPine (NORVASC) 5 MG tablet TAKE 1 TABLET BY MOUTH EVERY DAY 9/25/24   Thelma Foster APRN   Antarctic Krill Oil 500 MG capsule  9/1/23   Albert Arzate MD   Biotin 5 MG tablet Take 3 tablets by mouth.    ProviderAlbert MD   COLLAGEN PO Take 10 mg by mouth Daily.    Albert Arzate MD   escitalopram (Lexapro) 10 MG tablet Take 1 tablet by mouth Daily. 9/30/24   Thelma Foster APRN   fluticasone (FLONASE) 50 MCG/ACT nasal spray 2 sprays by Each Nare route Daily.    Albert Arzate MD   hydroCHLOROthiazide 12.5 MG  "tablet TAKE 1 TABLET BY MOUTH EVERY DAY 9/25/24   Thelma Foster APRN   Klor-Con M20 20 MEQ CR tablet TAKE 1 TABLET BY MOUTH EVERY DAY 12/26/24   Thelma Foster APRN   levocetirizine (XYZAL) 5 MG tablet Take 0.5 tablets by mouth Every Evening.    Provider, MD Albert   montelukast (Singulair) 10 MG tablet Take 1 tablet by mouth Every Night. 4/25/24   Thelma Foster APRN   Multiple Vitamins-Calcium (ONE-A-DAY WOMENS FORMULA PO) One-A-Day Womens Formula    Provider, MD Albert   nitroglycerin (NITROSTAT) 0.4 MG SL tablet Place 1 tablet under the tongue Every 5 (Five) Minutes As Needed for Chest Pain for up to 20 doses. Take no more than 3 doses in 15 minutes. 10/6/24   Rudy Kaufman MD   pantoprazole (PROTONIX) 40 MG EC tablet Take 1 tablet by mouth Daily. 9/30/24   Thelma Foster APRN   psyllium (METAMUCIL SMOOTH TEXTURE) 28 % packet Take  by mouth.    Provider, MD Albert       Objective     Vital Signs: /86   Pulse 71   Temp 98.4 °F (36.9 °C)   Resp 18   Ht 162.6 cm (64\")   Wt 85.3 kg (188 lb)   LMP  (LMP Unknown)   SpO2 95%   BMI 32.27 kg/m²     Physical Exam    Physical Exam  Vitals reviewed.   Constitutional:       Appearance: She is well-developed.   HENT:      Head: Normocephalic and atraumatic.   Eyes:      Pupils: Pupils are equal, round, and reactive to light.   Neck:      Vascular: No JVD.   Cardiovascular:      Rate and Rhythm: Normal rate and regular rhythm.   Pulmonary:      Effort: Pulmonary effort is normal.   Abdominal:      General: Bowel sounds are normal.      Palpations: Abdomen is soft.   Musculoskeletal:         General: No deformity.      Cervical back: Normal range of motion and neck supple.   Lymphadenopathy:      Cervical: No cervical adenopathy.   Skin:     General: Skin is warm and dry.   Neurological:      Mental Status: She is alert and oriented to person, place, and time.   Psychiatric:         Behavior: Behavior " normal.         Thought Content: Thought content normal.         Judgment: Judgment normal.      Results Reviewed:  Glucose   Date Value Ref Range Status   10/06/2024 143 (H) 65 - 99 mg/dL Final   12/03/2020 91 74 - 109 mg/dL Final     BUN   Date Value Ref Range Status   10/06/2024 9 8 - 23 mg/dL Final   12/03/2020 11 6 - 20 mg/dL Final     Creatinine   Date Value Ref Range Status   10/06/2024 0.75 0.57 - 1.00 mg/dL Final   12/03/2020 0.6 0.5 - 0.9 mg/dL Final     Sodium   Date Value Ref Range Status   10/06/2024 139 136 - 145 mmol/L Final   12/03/2020 140 136 - 145 mmol/L Final     Potassium   Date Value Ref Range Status   10/06/2024 3.2 (L) 3.5 - 5.2 mmol/L Final   12/03/2020 4.0 3.5 - 5.0 mmol/L Final     Chloride   Date Value Ref Range Status   10/06/2024 96 (L) 98 - 107 mmol/L Final   12/03/2020 101 98 - 111 mmol/L Final     CO2   Date Value Ref Range Status   10/06/2024 27.0 22.0 - 29.0 mmol/L Final   12/03/2020 28 22 - 29 mmol/L Final     Calcium   Date Value Ref Range Status   10/06/2024 9.7 8.6 - 10.5 mg/dL Final   12/03/2020 9.7 8.6 - 10.0 mg/dL Final     ALT (SGPT)   Date Value Ref Range Status   10/06/2024 25 1 - 33 U/L Final   12/03/2020 13 5 - 33 U/L Final     AST (SGOT)   Date Value Ref Range Status   10/06/2024 28 1 - 32 U/L Final   12/03/2020 18 5 - 32 U/L Final     WBC   Date Value Ref Range Status   10/06/2024 8.79 3.40 - 10.80 10*3/mm3 Final   04/22/2024 6.0 3.4 - 10.8 x10E3/uL Final   12/03/2020 6.9 4.8 - 10.8 K/uL Final     Hematocrit   Date Value Ref Range Status   10/06/2024 46.4 34.0 - 46.6 % Final   12/03/2020 43.5 37.0 - 47.0 % Final     Platelets   Date Value Ref Range Status   10/06/2024 298 140 - 450 10*3/mm3 Final   12/03/2020 264 130 - 400 K/uL Final   06/12/2019 303 150 - 450 10*3/uL Final     Triglycerides   Date Value Ref Range Status   04/22/2024 223 (H) 0 - 149 mg/dL Final   07/25/2018 63 <=150 mg/dL Final     Comment:     Desirable    <149 mg/dL  Borderline   150-199  mg/dL  Higher Risk  >200 mg/dL     HDL Cholesterol   Date Value Ref Range Status   04/22/2024 49 >39 mg/dL Final   07/25/2018 58 >=40 mg/dL Final     Comment:     Desirable    >39 mg/dL  Higher Risk  <39 mg/dL     LDL Cholesterol    Date Value Ref Range Status   07/25/2018 104 <=130 mg/dL Final     Comment:     Desirable    <100mg/dL  Borderline   100-159 mg/dL  Higher Risk  >160 mg/dL     LDL Chol Calc (NIH)   Date Value Ref Range Status   04/22/2024 109 (H) 0 - 99 mg/dL Final     Hemoglobin A1C   Date Value Ref Range Status   04/22/2024 5.7 (H) 4.8 - 5.6 % Final     Comment:              Prediabetes: 5.7 - 6.4           Diabetes: >6.4           Glycemic control for adults with diabetes: <7.0         Results  Imaging  Stress echo was negative. A nodule was observed in the left lung.      Assessment / Plan     Assessment/Plan:  Diagnoses and all orders for this visit:    1. Sore throat (Primary)  -     amoxicillin-clavulanate (AUGMENTIN) 875-125 MG per tablet; Take 1 tablet by mouth 2 (Two) Times a Day.  Dispense: 14 tablet; Refill: 0  -     methylPREDNISolone (MEDROL) 4 MG dose pack; Take as directed on package instructions.  Dispense: 21 tablet; Refill: 0    2. Lung nodule, solitary  -     CT Chest Without Contrast; Future    3. Pharyngitis, unspecified etiology  -     amoxicillin-clavulanate (AUGMENTIN) 875-125 MG per tablet; Take 1 tablet by mouth 2 (Two) Times a Day.  Dispense: 14 tablet; Refill: 0  -     methylPREDNISolone (MEDROL) 4 MG dose pack; Take as directed on package instructions.  Dispense: 21 tablet; Refill: 0    4. Moderate major depression      Assessment & Plan  1. Pharyngitis.  Her symptoms suggest a viral etiology, causing the observed swelling. She reports a sore throat and drainage, with significant swelling of the uvula noted upon waking.    2. Lung nodule.  A 6 mm nodule in the left lower lobe of the lung was identified during a stress echo on 10/10/2023. The nodule appears benign. A  follow-up non-contrast CT scan is scheduled for 04/15/2024 to monitor the nodule.    3. Stress management.  She has been experiencing stress related to the recent passing of her . She is currently on Lexapro 5 mg daily but wishes to discontinue it due to side effects, including loose stools and diarrhea. She is advised to take Lexapro 5 mg every other day for a few weeks before stopping it completely. She is also counseled that she may need to resume the medication temporarily if her grief becomes overwhelming.    No follow-ups on file. unless patient needs to be seen sooner or acute issues arise.    Code Status: Full.     Patient or patient representative verbalized consent for the use of Ambient Listening during the visit with  RAYRAY Velázquez for chart documentation. 12/30/2024  11:26 CST    I have discussed the patient results/orders and and plan/recommendation with them at today's visit.      Signed by:    RAYRAY Velázquez Date: 12/30/24

## 2025-03-13 ENCOUNTER — OFFICE VISIT (OUTPATIENT)
Dept: OBSTETRICS AND GYNECOLOGY | Age: 65
End: 2025-03-13
Payer: COMMERCIAL

## 2025-03-13 VITALS
DIASTOLIC BLOOD PRESSURE: 84 MMHG | BODY MASS INDEX: 33.12 KG/M2 | SYSTOLIC BLOOD PRESSURE: 132 MMHG | HEIGHT: 64 IN | WEIGHT: 194 LBS

## 2025-03-13 DIAGNOSIS — C50.512 MALIGNANT NEOPLASM OF LOWER-OUTER QUADRANT OF LEFT BREAST OF FEMALE, ESTROGEN RECEPTOR POSITIVE: ICD-10-CM

## 2025-03-13 DIAGNOSIS — Z17.0 MALIGNANT NEOPLASM OF LOWER-OUTER QUADRANT OF LEFT BREAST OF FEMALE, ESTROGEN RECEPTOR POSITIVE: ICD-10-CM

## 2025-03-13 DIAGNOSIS — N81.6 CYSTOCELE WITH RECTOCELE: ICD-10-CM

## 2025-03-13 DIAGNOSIS — Z87.42 HISTORY OF ABNORMAL CERVICAL PAP SMEAR: ICD-10-CM

## 2025-03-13 DIAGNOSIS — N81.10 CYSTOCELE WITH RECTOCELE: ICD-10-CM

## 2025-03-13 DIAGNOSIS — M85.80 OSTEOPENIA, SENILE: ICD-10-CM

## 2025-03-13 DIAGNOSIS — Z01.419 WELL WOMAN EXAM WITH ROUTINE GYNECOLOGICAL EXAM: Primary | ICD-10-CM

## 2025-03-13 PROCEDURE — 87624 HPV HI-RISK TYP POOLED RSLT: CPT | Performed by: NURSE PRACTITIONER

## 2025-03-13 PROCEDURE — 87625 HPV TYPES 16 & 18 ONLY: CPT | Performed by: NURSE PRACTITIONER

## 2025-03-13 PROCEDURE — G0123 SCREEN CERV/VAG THIN LAYER: HCPCS | Performed by: NURSE PRACTITIONER

## 2025-03-13 PROCEDURE — 99396 PREV VISIT EST AGE 40-64: CPT | Performed by: NURSE PRACTITIONER

## 2025-03-13 PROCEDURE — 99459 PELVIC EXAMINATION: CPT | Performed by: NURSE PRACTITIONER

## 2025-03-13 RX ORDER — NEOMYCIN SULFATE, POLYMYXIN B SULFATE, HYDROCORTISONE 3.5; 10000; 1 MG/ML; [USP'U]/ML; MG/ML
SOLUTION/ DROPS AURICULAR (OTIC)
COMMUNITY
Start: 2025-02-14

## 2025-03-13 NOTE — PROGRESS NOTES
"Chief Complaint   Patient presents with    Gynecologic Exam     Patient here for annual, last pap 2/1/24 normal/+HPV, colpo 2/15/24 normal-repeat in 1 year, mammogram 2/14/25, dexa 9/20/23, colonoscopy 5/17/22. Patient denies any problems or concerns.           Subjective     Telma Herrera is a 65 y.o. female    History of Present Illness  Pt comes in today for annual wellness exam. Denies any complaints.     /84 (BP Location: Left arm, Patient Position: Sitting, Cuff Size: Large Adult)   Ht 162.6 cm (64\")   Wt 88 kg (194 lb)   LMP  (LMP Unknown)   BMI 33.30 kg/m²     Outpatient Encounter Medications as of 3/13/2025   Medication Sig Dispense Refill    amLODIPine (NORVASC) 5 MG tablet TAKE 1 TABLET BY MOUTH EVERY DAY 90 tablet 1    Biotin 5 MG tablet Take 3 tablets by mouth.      COLLAGEN PO Take 10 mg by mouth Daily.      fluticasone (FLONASE) 50 MCG/ACT nasal spray 2 sprays by Each Nare route Daily.      hydroCHLOROthiazide 12.5 MG tablet TAKE 1 TABLET BY MOUTH EVERY DAY 90 tablet 1    Klor-Con M20 20 MEQ CR tablet TAKE 1 TABLET BY MOUTH EVERY DAY 90 tablet 2    levocetirizine (XYZAL) 5 MG tablet Take 0.5 tablets by mouth Every Evening.      montelukast (Singulair) 10 MG tablet Take 1 tablet by mouth Every Night. 90 tablet 1    Multiple Vitamins-Calcium (ONE-A-DAY WOMENS FORMULA PO) One-A-Day Womens Formula      neomycin-polymyxin-hydrocortisone (CORTISPORIN) 1 % solution otic solution Please see attached for detailed directions      pantoprazole (PROTONIX) 40 MG EC tablet Take 1 tablet by mouth Daily. 90 tablet 3    psyllium (METAMUCIL SMOOTH TEXTURE) 28 % packet Take  by mouth.      [DISCONTINUED] amoxicillin-clavulanate (AUGMENTIN) 875-125 MG per tablet Take 1 tablet by mouth 2 (Two) Times a Day. 14 tablet 0    [DISCONTINUED] Antarctic Krill Oil 500 MG capsule       [DISCONTINUED] escitalopram (Lexapro) 10 MG tablet Take 1 tablet by mouth Daily. 90 tablet 1    [DISCONTINUED] methylPREDNISolone " (MEDROL) 4 MG dose pack Take as directed on package instructions. 21 tablet 0    [DISCONTINUED] nitroglycerin (NITROSTAT) 0.4 MG SL tablet Place 1 tablet under the tongue Every 5 (Five) Minutes As Needed for Chest Pain for up to 20 doses. Take no more than 3 doses in 15 minutes. 20 tablet 0     No facility-administered encounter medications on file as of 3/13/2025.       Past Medical History  Past Medical History:   Diagnosis Date    Anemia 2019    Arthritis     Jean-Baptiste's esophagus     Breast cancer 2019    Drug therapy     Fatty liver disease, nonalcoholic     GERD (gastroesophageal reflux disease)     Hiatal hernia     HL (hearing loss) 1981    Hx of radiation therapy 2019    Hypertension     Hypokalemia     Idiopathic hematuria     Kidney stones     Lipoma     Osteopenia     Renal disorder     UTI (urinary tract infection)     Visual impairment 6/2022        Surgical History  Past Surgical History:   Procedure Laterality Date    BREAST BIOPSY Left 2019    cancer    BREAST LUMPECTOMY Left 2019    COLONOSCOPY      2011?    COLONOSCOPY N/A 05/17/2022    Procedure: COLONOSCOPY WITH ANESTHESIA;  Surgeon: Saeed Tom MD;  Location: [facility] ENDOSCOPY;  Service: Gastroenterology;  Laterality: N/A;  preop; screening   postop; diverticulosis ; hemmhroids   PCP Shanelle Foster    DILATION AND CURETTAGE, DIAGNOSTIC / THERAPEUTIC      ENDOSCOPY N/A 05/17/2022    Procedure: ESOPHAGOGASTRODUODENOSCOPY WITH ANESTHESIA;  Surgeon: Saeed Tom MD;  Location: [facility] ENDOSCOPY;  Service: Gastroenterology;  Laterality: N/A;  preop; gerd  postop; esophagitis   PCP Shanelle Foster    ENDOSCOPY N/A 06/03/2022    Procedure: ESOPHAGOGASTRODUODENOSCOPY;  Surgeon: Saeed Tom MD;  Location: [facility] ENDOSCOPY;  Service: Gastroenterology;  Laterality: N/A;  preop; GERD  postop hiatal hernia  PCP Thelma Foster     ENDOSCOPY N/A 08/28/2023    Procedure: ESOPHAGOGASTRODUODENOSCOPY WITH ANESTHESIA;  Surgeon: Saeed Tom MD;   Location: [facility] ENDOSCOPY;  Service: Gastroenterology;  Laterality: N/A;  pre baarrett's, hiatal hernia  post cueto's hiatal hernia,duodenal bulb polyp  Thelma Foster    LIPOMA RESECTION Left 2007    axilla area    LYMPH NODE BIOPSY  1/31/2019    In conjunction with breast lumpectomy    OTHER SURGICAL HISTORY      rectocele    RECTOCELE REPAIR      WISDOM TOOTH EXTRACTION         Family History  Family History   Problem Relation Age of Onset    Leukemia Father     Lymphoma Father     Hodgkin's lymphoma Father     Cancer Father         Lymphoma and CLL    Hypertension Mother     Diabetes Mother         Type 2    Stroke Mother     High cholesterol Brother     Hypertension Brother     Diabetes Brother     COPD Brother         He smoked for years.    Stroke Brother         TIA    Vision loss Maternal Aunt         Glaucoma    Colon cancer Neg Hx     Colon polyps Neg Hx     Ovarian cancer Neg Hx     Breast cancer Neg Hx     Uterine cancer Neg Hx     Melanoma Neg Hx     Prostate cancer Neg Hx        The following portions of the patient's history were reviewed and updated as appropriate: allergies, current medications, past family history, past medical history, past social history, past surgical history, and problem list.    Review of Systems    Objective   Physical Exam  Vitals and nursing note reviewed. Exam conducted with a chaperone present.   Constitutional:       General: She is not in acute distress.     Appearance: She is well-developed. She is not diaphoretic.   HENT:      Head: Normocephalic.      Right Ear: External ear normal.      Left Ear: External ear normal.      Nose: Nose normal.   Eyes:      General: No scleral icterus.        Right eye: No discharge.         Left eye: No discharge.      Conjunctiva/sclera: Conjunctivae normal.      Pupils: Pupils are equal, round, and reactive to light.   Neck:      Thyroid: No thyromegaly.      Vascular: No carotid bruit.      Trachea: No tracheal deviation.    Cardiovascular:      Rate and Rhythm: Normal rate and regular rhythm.      Heart sounds: Normal heart sounds. No murmur heard.  Pulmonary:      Effort: Pulmonary effort is normal. No respiratory distress.      Breath sounds: Normal breath sounds. No wheezing.   Chest:   Breasts:     Breasts are symmetrical.      Right: Normal. No swelling, bleeding, inverted nipple, mass, nipple discharge, skin change or tenderness.      Left: Normal. No swelling, bleeding, inverted nipple, mass, nipple discharge, skin change or tenderness.   Abdominal:      General: There is no distension.      Palpations: Abdomen is soft. There is no mass.      Tenderness: There is no abdominal tenderness. There is no right CVA tenderness, left CVA tenderness or guarding.      Hernia: No hernia is present. There is no hernia in the left inguinal area or right inguinal area.   Genitourinary:     General: Normal vulva.      Exam position: Lithotomy position.      Labia:         Right: No rash, tenderness, lesion or injury.         Left: No rash, tenderness, lesion or injury.       Vagina: Normal. No signs of injury and foreign body. No vaginal discharge, erythema, tenderness or bleeding.      Cervix: Normal.      Uterus: Normal. With uterine prolapse. Not enlarged, not fixed and not tender.       Adnexa: Right adnexa normal and left adnexa normal.        Right: No mass, tenderness or fullness.          Left: No mass, tenderness or fullness.        Rectum: Normal. No mass.      Comments:   BSU normal  Urethral meatus  Normal  Perineum  Normal    Grade 3 rectocele   Grade 2 cystocele  Musculoskeletal:         General: No tenderness. Normal range of motion.      Cervical back: Normal range of motion and neck supple.   Lymphadenopathy:      Head:      Right side of head: No submental, submandibular, tonsillar, preauricular, posterior auricular or occipital adenopathy.      Left side of head: No submental, submandibular, tonsillar, preauricular,  posterior auricular or occipital adenopathy.      Cervical: No cervical adenopathy.      Right cervical: No superficial, deep or posterior cervical adenopathy.     Left cervical: No superficial, deep or posterior cervical adenopathy.      Upper Body:      Right upper body: No supraclavicular, axillary or pectoral adenopathy.      Left upper body: No supraclavicular, axillary or pectoral adenopathy.      Lower Body: No right inguinal adenopathy. No left inguinal adenopathy.   Skin:     General: Skin is warm and dry.      Findings: No bruising, erythema or rash.   Neurological:      Mental Status: She is alert and oriented to person, place, and time.      Coordination: Coordination normal.   Psychiatric:         Mood and Affect: Mood normal.         Behavior: Behavior normal.         Thought Content: Thought content normal.         Judgment: Judgment normal.         Assessment & Plan   Diagnoses and all orders for this visit:    1. Well woman exam with routine gynecological exam (Primary)  -     Liquid-based Pap Smear, Screening    2. History of abnormal cervical Pap smear  -     Liquid-based Pap Smear, Screening    3. Cystocele with rectocele    4. Osteopenia, senile  -     DEXA Bone Density Axial; Future    5. Malignant neoplasm of lower-outer quadrant of left breast of female, estrogen receptor positive         Normal GYN exam. Encouraged SBE, pt is aware how to do self breast exam and the importance of same. Discussed weight management and importance of maintaining a healthy weight. Discussed Vitamin D intake and the importance of adequate vitamin D for both bone health and a healthy immune system.  Discussed daily exercise and the importance of same, in regards to a healthy heart as well as helping to maintain her weight and improving her mental health.  Colonoscopy is up to date. Mammogram is up to date. Bone density is up to date. Pap smear is done per ASCCP guidelines. HPV is done. Lab work up is up to date  through PCP.     Rectocele and cystocele noted on exam. Has had surgery previously. PT denies any symptoms and is going to work on at home exercises.      PT no longer on Arimidex.     Indigo Kovacs, APRN  3/13/2025    No follow-ups on file.

## 2025-03-17 LAB
GEN CATEG CVX/VAG CYTO-IMP: ABNORMAL
HPV I/H RISK 4 DNA CVX QL PROBE+SIG AMP: DETECTED
HPV16 DNA SPEC QL NAA+PROBE: NOT DETECTED
HPV18+45 E6+E7 MRNA CVX QL NAA+PROBE: DETECTED
LAB AP CASE REPORT: ABNORMAL
LAB AP GYN ADDITIONAL INFORMATION: ABNORMAL
LAB AP GYN OTHER FINDINGS: ABNORMAL
Lab: ABNORMAL
PATH INTERP SPEC-IMP: ABNORMAL
STAT OF ADQ CVX/VAG CYTO-IMP: ABNORMAL

## 2025-03-25 DIAGNOSIS — I10 ESSENTIAL (PRIMARY) HYPERTENSION: ICD-10-CM

## 2025-03-25 RX ORDER — HYDROCHLOROTHIAZIDE 12.5 MG/1
12.5 TABLET ORAL DAILY
Qty: 90 TABLET | Refills: 1 | Status: SHIPPED | OUTPATIENT
Start: 2025-03-25

## 2025-03-25 RX ORDER — AMLODIPINE BESYLATE 5 MG/1
5 TABLET ORAL DAILY
Qty: 90 TABLET | Refills: 1 | Status: SHIPPED | OUTPATIENT
Start: 2025-03-25

## 2025-03-25 NOTE — TELEPHONE ENCOUNTER
Rx Refill Note  Requested Prescriptions     Pending Prescriptions Disp Refills    hydroCHLOROthiazide 12.5 MG tablet [Pharmacy Med Name: HYDROCHLOROTHIAZIDE 12.5 MG TB] 90 tablet 1     Sig: TAKE 1 TABLET BY MOUTH EVERY DAY    amLODIPine (NORVASC) 5 MG tablet [Pharmacy Med Name: AMLODIPINE BESYLATE 5 MG TAB] 90 tablet 1     Sig: TAKE 1 TABLET BY MOUTH EVERY DAY      Last office visit with prescribing clinician: 12/30/2024   Last telemedicine visit with prescribing clinician: Visit date not found   Next office visit with prescribing clinician: 3/28/2025                         Would you like a call back once the refill request has been completed: [] Yes [] No    If the office needs to give you a call back, can they leave a voicemail: [] Yes [] No    Deedee Lopez RN  03/25/25, 07:07 CDT

## 2025-04-18 ENCOUNTER — OFFICE VISIT (OUTPATIENT)
Age: 65
End: 2025-04-18
Payer: COMMERCIAL

## 2025-04-18 VITALS
WEIGHT: 199.1 LBS | BODY MASS INDEX: 33.99 KG/M2 | HEIGHT: 64 IN | SYSTOLIC BLOOD PRESSURE: 128 MMHG | DIASTOLIC BLOOD PRESSURE: 84 MMHG

## 2025-04-18 DIAGNOSIS — R87.612 LGSIL ON PAP SMEAR OF CERVIX: Primary | ICD-10-CM

## 2025-04-18 PROCEDURE — 88305 TISSUE EXAM BY PATHOLOGIST: CPT | Performed by: OBSTETRICS & GYNECOLOGY

## 2025-04-18 NOTE — PROGRESS NOTES
"Telma Herrera is a 65 y.o. female  here today for colposcopy.  Her most recent Pap smear was read as LGSIL and HPV 18 positive.  Pap normal and HPV 18 positive last year, normal colposcopy.    /84 (BP Location: Right arm, Patient Position: Sitting, Cuff Size: Adult)   Ht 162.6 cm (64.02\")   Wt 90.3 kg (199 lb 1.6 oz)   LMP  (LMP Unknown)   Breastfeeding No   BMI 34.16 kg/m²      Colposcopy was performed in the office today.  She was placed in the lithotomy position on the exam table.  A speculum was inserted and the cervix well visualized.  The cervix was cleansed with acetic acid swabs.  Inspection with the colposcope showed the transformation zone to be endocervical.  There were acetowhite lesions noted at 6:00. Colposcopy was not satisfactory. The cervix was anesthetized with Hurricaine spray.  A 6:00 biopsy was performed.  The biopsy site was made hemostatic with a silver nitrate stick.  An endocervical curettage was performed.  She tolerated the procedure well.    Colposcopic impression: Atrophy, possible mild dysplasia    We will notify her when the pathology report is available to discuss further care.  We have discussed post colposcopy instructions.   "

## 2025-04-23 ENCOUNTER — HOSPITAL ENCOUNTER (OUTPATIENT)
Dept: CT IMAGING | Facility: HOSPITAL | Age: 65
Discharge: HOME | End: 2025-04-23
Admitting: NURSE PRACTITIONER
Payer: COMMERCIAL

## 2025-04-23 DIAGNOSIS — R91.1 LUNG NODULE, SOLITARY: ICD-10-CM

## 2025-04-23 PROCEDURE — 71250 CT THORAX DX C-: CPT

## 2025-04-25 LAB
CYTO UR: NORMAL
CYTO UR: NORMAL
LAB AP CASE REPORT: NORMAL
LAB AP CASE REPORT: NORMAL
LAB AP CLINICAL INFORMATION: NORMAL
LAB AP CLINICAL INFORMATION: NORMAL
Lab: NORMAL
Lab: NORMAL
PATH REPORT.FINAL DX SPEC: NORMAL
PATH REPORT.FINAL DX SPEC: NORMAL
PATH REPORT.GROSS SPEC: NORMAL
PATH REPORT.GROSS SPEC: NORMAL

## 2025-04-29 ENCOUNTER — OFFICE VISIT (OUTPATIENT)
Dept: INTERNAL MEDICINE | Facility: CLINIC | Age: 65
End: 2025-04-29
Payer: COMMERCIAL

## 2025-04-29 VITALS
SYSTOLIC BLOOD PRESSURE: 122 MMHG | RESPIRATION RATE: 16 BRPM | BODY MASS INDEX: 34.04 KG/M2 | DIASTOLIC BLOOD PRESSURE: 82 MMHG | HEIGHT: 64 IN | HEART RATE: 80 BPM | TEMPERATURE: 98.6 F | WEIGHT: 199.4 LBS | OXYGEN SATURATION: 98 %

## 2025-04-29 DIAGNOSIS — Z00.00 ROUTINE GENERAL MEDICAL EXAMINATION AT A HEALTH CARE FACILITY: Primary | ICD-10-CM

## 2025-04-29 PROCEDURE — 99396 PREV VISIT EST AGE 40-64: CPT | Performed by: NURSE PRACTITIONER

## 2025-04-29 NOTE — PROGRESS NOTES
Subjective     Chief Complaint   Patient presents with    Annual Exam       History of Present Illness    Pt comes in today for her annual exam. She is overall doing fair. Is trying to deal with the death of her  and her son's child support issues. State she is overall stressed. She had a colposcopy which was moderately abnormal so she now needs a LEEP procedure which also creates stress. Denies any chest pain or shortness of breath.No nausea or vomiting. No urinary or bowel complaints. She has seen Dr. Anthony in [place] and his exam was normal.     Past Medical History:   Past Medical History:   Diagnosis Date    Anemia 2019    Due to chemotherapy    Arthritis     Jean-Baptiste's esophagus     Breast cancer 2019    left breast    Drug therapy     Fatty liver disease, nonalcoholic     GERD (gastroesophageal reflux disease)     Hiatal hernia     HL (hearing loss) 1981    Hx of radiation therapy 2019    left breast    Hypertension     Hypokalemia     Idiopathic hematuria     Kidney stones     Lipoma     Osteopenia     Renal disorder     UTI (urinary tract infection)     Visual impairment 6/2022    Vitreous detachment     Past Surgical History:  Past Surgical History:   Procedure Laterality Date    BREAST BIOPSY Left 2019    cancer    BREAST LUMPECTOMY Left 2019    COLONOSCOPY      2011?    COLONOSCOPY N/A 05/17/2022    Procedure: COLONOSCOPY WITH ANESTHESIA;  Surgeon: Saeed Tom MD;  Location: [facility] ENDOSCOPY;  Service: Gastroenterology;  Laterality: N/A;  preop; screening   postop; diverticulosis ; hemmhroids   PCP Shanelle Foster    DILATION AND CURETTAGE, DIAGNOSTIC / THERAPEUTIC      ENDOSCOPY N/A 05/17/2022    Procedure: ESOPHAGOGASTRODUODENOSCOPY WITH ANESTHESIA;  Surgeon: Saeed Tom MD;  Location: [facility] ENDOSCOPY;  Service: Gastroenterology;  Laterality: N/A;  preop; gerd  postop; esophagitis   PCP Shanelle Foster    ENDOSCOPY N/A 06/03/2022    Procedure: ESOPHAGOGASTRODUODENOSCOPY;   Surgeon: Saeed Tom MD;  Location: [facility] ENDOSCOPY;  Service: Gastroenterology;  Laterality: N/A;  preop; GERD  postop hiatal hernia  PCP Thelma Kristin     ENDOSCOPY N/A 08/28/2023    Procedure: ESOPHAGOGASTRODUODENOSCOPY WITH ANESTHESIA;  Surgeon: Saeed Tom MD;  Location: [facility] ENDOSCOPY;  Service: Gastroenterology;  Laterality: N/A;  pre baarrett's, hiatal hernia  post cueto's hiatal hernia,duodenal bulb polyp  Thelma Kristin    LIPOMA RESECTION Left 2007    axilla area    LYMPH NODE BIOPSY  1/31/2019    In conjunction with breast lumpectomy    OTHER SURGICAL HISTORY      rectocele    RECTOCELE REPAIR      WISDOM TOOTH EXTRACTION       Social History:  reports that she has never smoked. She has never used smokeless tobacco. She reports that she does not currently use alcohol. She reports that she does not use drugs.    Family History: family history includes COPD in her brother; Cancer in her father; Diabetes in her brother and mother; High cholesterol in her brother; Hodgkin's lymphoma in her father; Hypertension in her brother and mother; Leukemia in her father; Lymphoma in her father; Stroke in her brother and mother; Vision loss in her maternal aunt.       Allergies:  Allergies   Allergen Reactions    Ace Inhibitors Other (See Comments), Swelling and Unknown - High Severity     lisinopril  lisinopril      Omeprazole Other (See Comments) and Unknown - High Severity     Developed swollen lips with tiny white sores    Sulfa Antibiotics Swelling and Unknown - Low Severity    Levofloxacin Myalgia and Other (See Comments)     Muscle pain      Lisinopril Angioedema     Medications:  Prior to Admission medications    Medication Sig Start Date End Date Taking? Authorizing Provider   amLODIPine (NORVASC) 5 MG tablet TAKE 1 TABLET BY MOUTH EVERY DAY 3/25/25   Thelma Foster APRN   Biotin 5 MG tablet Take 3 tablets by mouth.    Provider, MD Albert   COLLAGEN PO Take 10 mg by mouth  "Daily.    Albert Arzate MD   fluticasone (FLONASE) 50 MCG/ACT nasal spray 2 sprays by Each Nare route Daily.    Albert Arzate MD   hydroCHLOROthiazide 12.5 MG tablet TAKE 1 TABLET BY MOUTH EVERY DAY 3/25/25   Thelma Foster APRN   Klor-Con M20 20 MEQ CR tablet TAKE 1 TABLET BY MOUTH EVERY DAY 12/26/24   Thelma Foster APRN   levocetirizine (XYZAL) 5 MG tablet Take 0.5 tablets by mouth Every Evening.    Albert Arzate MD   montelukast (Singulair) 10 MG tablet Take 1 tablet by mouth Every Night. 4/25/24   Thelma Fotser APRN   Multiple Vitamins-Calcium (ONE-A-DAY WOMENS FORMULA PO) One-A-Day Womens Formula    Albert Arzate MD   neomycin-polymyxin-hydrocortisone (CORTISPORIN) 1 % solution otic solution Please see attached for detailed directions 2/14/25   Albert Arzate MD   pantoprazole (PROTONIX) 40 MG EC tablet Take 1 tablet by mouth Daily. 9/30/24   Thelma Foster APRN   psyllium (METAMUCIL SMOOTH TEXTURE) 28 % packet Take  by mouth.    Albert Arzate MD       Objective     Vital Signs: /82   Pulse 80   Temp 98.6 °F (37 °C)   Resp 16   Ht 162.6 cm (64\")   Wt 90.4 kg (199 lb 6.4 oz)   LMP  (LMP Unknown)   SpO2 98%   BMI 34.23 kg/m²     Physical Exam    Physical Exam  Vitals reviewed.   Constitutional:       Appearance: Normal appearance. She is well-developed.   HENT:      Head: Normocephalic and atraumatic.   Eyes:      Pupils: Pupils are equal, round, and reactive to light.   Neck:      Vascular: No JVD.   Cardiovascular:      Rate and Rhythm: Normal rate and regular rhythm.   Pulmonary:      Effort: Pulmonary effort is normal.      Breath sounds: Normal breath sounds.   Abdominal:      General: Bowel sounds are normal.      Palpations: Abdomen is soft.   Musculoskeletal:         General: No deformity.      Cervical back: Normal range of motion and neck supple.   Lymphadenopathy:      Cervical: No cervical " adenopathy.   Skin:     General: Skin is warm and dry.   Neurological:      General: No focal deficit present.      Mental Status: She is alert and oriented to person, place, and time.   Psychiatric:         Behavior: Behavior normal.         Thought Content: Thought content normal.         Judgment: Judgment normal.       BMI is >= 30 and <35. (Class 1 Obesity). The following options were offered after discussion;: exercise counseling/recommendations and nutrition counseling/recommendations      Results Reviewed:  Glucose   Date Value Ref Range Status   10/06/2024 143 (H) 65 - 99 mg/dL Final   12/03/2020 91 74 - 109 mg/dL Final     BUN   Date Value Ref Range Status   10/06/2024 9 8 - 23 mg/dL Final   12/03/2020 11 6 - 20 mg/dL Final     Creatinine   Date Value Ref Range Status   10/06/2024 0.75 0.57 - 1.00 mg/dL Final   12/03/2020 0.6 0.5 - 0.9 mg/dL Final     Sodium   Date Value Ref Range Status   10/06/2024 139 136 - 145 mmol/L Final   12/03/2020 140 136 - 145 mmol/L Final     Potassium   Date Value Ref Range Status   10/06/2024 3.2 (L) 3.5 - 5.2 mmol/L Final   12/03/2020 4.0 3.5 - 5.0 mmol/L Final     Chloride   Date Value Ref Range Status   10/06/2024 96 (L) 98 - 107 mmol/L Final   12/03/2020 101 98 - 111 mmol/L Final     CO2   Date Value Ref Range Status   10/06/2024 27.0 22.0 - 29.0 mmol/L Final   12/03/2020 28 22 - 29 mmol/L Final     Calcium   Date Value Ref Range Status   10/06/2024 9.7 8.6 - 10.5 mg/dL Final   12/03/2020 9.7 8.6 - 10.0 mg/dL Final     ALT (SGPT)   Date Value Ref Range Status   10/06/2024 25 1 - 33 U/L Final   12/03/2020 13 5 - 33 U/L Final     AST (SGOT)   Date Value Ref Range Status   10/06/2024 28 1 - 32 U/L Final   12/03/2020 18 5 - 32 U/L Final     WBC   Date Value Ref Range Status   10/06/2024 8.79 3.40 - 10.80 10*3/mm3 Final   04/22/2024 6.0 3.4 - 10.8 x10E3/uL Final   12/03/2020 6.9 4.8 - 10.8 K/uL Final     Hematocrit   Date Value Ref Range Status   10/06/2024 46.4 34.0 - 46.6 %  Final   12/03/2020 43.5 37.0 - 47.0 % Final     Platelets   Date Value Ref Range Status   10/06/2024 298 140 - 450 10*3/mm3 Final   12/03/2020 264 130 - 400 K/uL Final   06/12/2019 303 150 - 450 10*3/uL Final     Triglycerides   Date Value Ref Range Status   04/22/2024 223 (H) 0 - 149 mg/dL Final   07/25/2018 63 <=150 mg/dL Final     Comment:     Desirable    <149 mg/dL  Borderline   150-199 mg/dL  Higher Risk  >200 mg/dL     HDL Cholesterol   Date Value Ref Range Status   04/22/2024 49 >39 mg/dL Final   07/25/2018 58 >=40 mg/dL Final     Comment:     Desirable    >39 mg/dL  Higher Risk  <39 mg/dL     LDL Cholesterol    Date Value Ref Range Status   07/25/2018 104 <=130 mg/dL Final     Comment:     Desirable    <100mg/dL  Borderline   100-159 mg/dL  Higher Risk  >160 mg/dL     LDL Chol Calc (NIH)   Date Value Ref Range Status   04/22/2024 109 (H) 0 - 99 mg/dL Final     Hemoglobin A1C   Date Value Ref Range Status   04/22/2024 5.7 (H) 4.8 - 5.6 % Final     Comment:              Prediabetes: 5.7 - 6.4           Diabetes: >6.4           Glycemic control for adults with diabetes: <7.0         Results        Assessment / Plan     Assessment/Plan:  Diagnoses and all orders for this visit:    1. Routine general medical examination at a health care facility (Primary)  -     CBC & Differential  -     Comprehensive Metabolic Panel  -     Lipid Panel  -     TSH  -     Vitamin B12  -     Vitamin D,25-Hydroxy  -     Hemoglobin A1c  -     T4, Free  -     T3, Free      Will have lab work here today. Encouraged SBE, pt is aware how to do self breast exam and the importance of same. Discussed weight management and importance of maintaining a healthy weight. Discussed Vitamin D intake and the importance of adequate vitamin D for both Bone Health and a healthy immune system.  Discussed Daily exercise and the importance of same, in regards to a healthy heart as well as helping to maintain her weight and improving her mental health.   BMI is elevated.   Colonoscopy is up to date.  Mammogram is up to date.   Assessment & Plan        Return in about 6 months (around 10/29/2025). unless patient needs to be seen sooner or acute issues arise.    Code Status: Full    I have discussed the patient results/orders and and plan/recommendation with them at today's visit.      Signed by:    RAYRAY Velázquez Date: 04/29/25

## 2025-04-30 LAB
25(OH)D3+25(OH)D2 SERPL-MCNC: 38.6 NG/ML (ref 30–100)
ALBUMIN SERPL-MCNC: 4.6 G/DL (ref 3.9–4.9)
ALP SERPL-CCNC: 155 IU/L (ref 44–121)
ALT SERPL-CCNC: 25 IU/L (ref 0–32)
AST SERPL-CCNC: 29 IU/L (ref 0–40)
BASOPHILS # BLD AUTO: 0.1 X10E3/UL (ref 0–0.2)
BASOPHILS NFR BLD AUTO: 1 %
BILIRUB SERPL-MCNC: 0.5 MG/DL (ref 0–1.2)
BUN SERPL-MCNC: 14 MG/DL (ref 8–27)
BUN/CREAT SERPL: 16 (ref 12–28)
CALCIUM SERPL-MCNC: 10 MG/DL (ref 8.7–10.3)
CHLORIDE SERPL-SCNC: 99 MMOL/L (ref 96–106)
CHOLEST SERPL-MCNC: 214 MG/DL (ref 100–199)
CO2 SERPL-SCNC: 25 MMOL/L (ref 20–29)
CREAT SERPL-MCNC: 0.85 MG/DL (ref 0.57–1)
EGFRCR SERPLBLD CKD-EPI 2021: 76 ML/MIN/1.73
EOSINOPHIL # BLD AUTO: 0.2 X10E3/UL (ref 0–0.4)
EOSINOPHIL NFR BLD AUTO: 3 %
ERYTHROCYTE [DISTWIDTH] IN BLOOD BY AUTOMATED COUNT: 12.9 % (ref 11.7–15.4)
GLOBULIN SER CALC-MCNC: 2.4 G/DL (ref 1.5–4.5)
GLUCOSE SERPL-MCNC: 94 MG/DL (ref 70–99)
HBA1C MFR BLD: 5.8 % (ref 4.8–5.6)
HCT VFR BLD AUTO: 44.9 % (ref 34–46.6)
HDLC SERPL-MCNC: 52 MG/DL
HGB BLD-MCNC: 14.7 G/DL (ref 11.1–15.9)
IMM GRANULOCYTES # BLD AUTO: 0 X10E3/UL (ref 0–0.1)
IMM GRANULOCYTES NFR BLD AUTO: 0 %
LDLC SERPL CALC-MCNC: 136 MG/DL (ref 0–99)
LYMPHOCYTES # BLD AUTO: 2 X10E3/UL (ref 0.7–3.1)
LYMPHOCYTES NFR BLD AUTO: 31 %
MCH RBC QN AUTO: 27.5 PG (ref 26.6–33)
MCHC RBC AUTO-ENTMCNC: 32.7 G/DL (ref 31.5–35.7)
MCV RBC AUTO: 84 FL (ref 79–97)
MONOCYTES # BLD AUTO: 0.6 X10E3/UL (ref 0.1–0.9)
MONOCYTES NFR BLD AUTO: 9 %
NEUTROPHILS # BLD AUTO: 3.6 X10E3/UL (ref 1.4–7)
NEUTROPHILS NFR BLD AUTO: 56 %
PLATELET # BLD AUTO: 262 X10E3/UL (ref 150–450)
POTASSIUM SERPL-SCNC: 3.7 MMOL/L (ref 3.5–5.2)
PROT SERPL-MCNC: 7 G/DL (ref 6–8.5)
RBC # BLD AUTO: 5.34 X10E6/UL (ref 3.77–5.28)
SODIUM SERPL-SCNC: 141 MMOL/L (ref 134–144)
T3FREE SERPL-MCNC: 3.3 PG/ML (ref 2–4.4)
T4 FREE SERPL-MCNC: 1.13 NG/DL (ref 0.82–1.77)
TRIGL SERPL-MCNC: 143 MG/DL (ref 0–149)
TSH SERPL DL<=0.005 MIU/L-ACNC: 1.61 UIU/ML (ref 0.45–4.5)
VIT B12 SERPL-MCNC: 1046 PG/ML (ref 232–1245)
VLDLC SERPL CALC-MCNC: 26 MG/DL (ref 5–40)
WBC # BLD AUTO: 6.4 X10E3/UL (ref 3.4–10.8)

## 2025-05-05 DIAGNOSIS — J30.1 NON-SEASONAL ALLERGIC RHINITIS DUE TO POLLEN: ICD-10-CM

## 2025-05-05 RX ORDER — FLUTICASONE PROPIONATE 50 MCG
2 SPRAY, SUSPENSION (ML) NASAL DAILY
Qty: 16 G | Refills: 6 | Status: SHIPPED | OUTPATIENT
Start: 2025-05-05

## 2025-05-05 RX ORDER — MONTELUKAST SODIUM 10 MG/1
10 TABLET ORAL NIGHTLY
Qty: 90 TABLET | Refills: 1 | Status: SHIPPED | OUTPATIENT
Start: 2025-05-05

## 2025-05-20 ENCOUNTER — OFFICE VISIT (OUTPATIENT)
Age: 65
End: 2025-05-20
Payer: COMMERCIAL

## 2025-05-20 VITALS
SYSTOLIC BLOOD PRESSURE: 128 MMHG | BODY MASS INDEX: 33.82 KG/M2 | HEIGHT: 64 IN | WEIGHT: 198.1 LBS | DIASTOLIC BLOOD PRESSURE: 82 MMHG

## 2025-05-20 DIAGNOSIS — N87.1 MODERATE DYSPLASIA OF CERVIX (CIN II): Primary | ICD-10-CM

## 2025-05-20 PROCEDURE — 99214 OFFICE O/P EST MOD 30 MIN: CPT | Performed by: OBSTETRICS & GYNECOLOGY

## 2025-05-20 RX ORDER — SODIUM CHLORIDE 9 MG/ML
100 INJECTION, SOLUTION INTRAVENOUS CONTINUOUS
OUTPATIENT
Start: 2025-05-20 | End: 2025-05-21

## 2025-05-20 RX ORDER — SODIUM CHLORIDE 9 MG/ML
40 INJECTION, SOLUTION INTRAVENOUS AS NEEDED
OUTPATIENT
Start: 2025-05-20

## 2025-05-20 RX ORDER — SODIUM CHLORIDE 0.9 % (FLUSH) 0.9 %
10 SYRINGE (ML) INJECTION EVERY 12 HOURS SCHEDULED
OUTPATIENT
Start: 2025-05-20

## 2025-05-20 RX ORDER — SODIUM CHLORIDE 0.9 % (FLUSH) 0.9 %
1-10 SYRINGE (ML) INJECTION AS NEEDED
OUTPATIENT
Start: 2025-05-20

## 2025-05-20 NOTE — PROGRESS NOTES
"Telma Herrera is a 65 y.o. female here today for treatment of cervical dysplasia.  Recent Pap smear was LGSIL and positive HPV 18.  Colposcopy revealed a lesion at 6:00 with biopsy revealing mild dysplasia and focal moderate dysplasia.    Visit Vitals  /82 (BP Location: Right arm, Patient Position: Sitting, Cuff Size: Adult)   Ht 162.6 cm (64.02\")   Wt 89.9 kg (198 lb 1.6 oz)   LMP  (LMP Unknown)   Breastfeeding No   BMI 33.99 kg/m²     Pleasant female no acute distress  Mood and affect normal  Breathing unlabored     Assessment: Moderate cervical dysplasia    I have recommended treatment of moderate cervical dysplasia. We discussed a planned surgical procedure of LEEP.  We discussed surgical risks of bleeding, infection, and recurrence.  All of her questions have been answered and she is scheduled for surgery on June 4.  Preoperative orders including CBC and BMP have been placed.  Call with questions or concerns.       "

## 2025-05-21 NOTE — H&P
[facility]  Telma Herrera  : 1960  MRN: [id number]  CSN: [id number]    History and Physical    Subjective   Telma Herrera is a 65 y.o. year old  who presents for consultation about surgery due to moderate cervical dysplasia.  Lesion noted at 6:00 on colposcopy.  Biopsy showed mild dysplasia with focal moderate dysplasia.  Prior Pap smear LGSIL with HPV 18.    Past Medical History:   Diagnosis Date    Anemia 2019    Due to chemotherapy    Arthritis     Jean-Baptiste's esophagus     Breast cancer 2019    left breast    Drug therapy     Fatty liver disease, nonalcoholic     GERD (gastroesophageal reflux disease)     Hiatal hernia     HL (hearing loss) 1981    Hx of radiation therapy 2019    left breast    Hypertension     Hypokalemia     Idiopathic hematuria     Kidney stones     Lipoma     Osteopenia     Renal disorder     UTI (urinary tract infection)     Visual impairment 2022    Vitreous detachment     Past Surgical History:   Procedure Laterality Date    BREAST BIOPSY Left 2019    cancer    BREAST LUMPECTOMY Left 2019    COLONOSCOPY      ?    COLONOSCOPY N/A 2022    Procedure: COLONOSCOPY WITH ANESTHESIA;  Surgeon: Saeed Tom MD;  Location: [facility] ENDOSCOPY;  Service: Gastroenterology;  Laterality: N/A;  preop; screening   postop; diverticulosis ; hemmhroids   PCP Shanelle Foster    DILATION AND CURETTAGE, DIAGNOSTIC / THERAPEUTIC      ENDOSCOPY N/A 2022    Procedure: ESOPHAGOGASTRODUODENOSCOPY WITH ANESTHESIA;  Surgeon: Saeed Tom MD;  Location: [facility] ENDOSCOPY;  Service: Gastroenterology;  Laterality: N/A;  preop; gerd  postop; esophagitis   PCP Shanelle Foster    ENDOSCOPY N/A 2022    Procedure: ESOPHAGOGASTRODUODENOSCOPY;  Surgeon: Saeed Tom MD;  Location: [facility] ENDOSCOPY;  Service: Gastroenterology;  Laterality: N/A;  preop; GERD  postop hiatal hernia  PCP Thelma Foster     ENDOSCOPY N/A 2023    Procedure: ESOPHAGOGASTRODUODENOSCOPY WITH  ANESTHESIA;  Surgeon: Saeed Tom MD;  Location: [facility] ENDOSCOPY;  Service: Gastroenterology;  Laterality: N/A;  pre baarrett's, hiatal hernia  post cueto's hiatal hernia,duodenal bulb polyp  Thelma Foster    LIPOMA RESECTION Left 2007    axilla area    LYMPH NODE BIOPSY  1/31/2019    In conjunction with breast lumpectomy    OTHER SURGICAL HISTORY      rectocele    RECTOCELE REPAIR      WISDOM TOOTH EXTRACTION       Social History    Tobacco Use      Smoking status: Never      Smokeless tobacco: Never      Current Outpatient Medications:     amLODIPine (NORVASC) 5 MG tablet, TAKE 1 TABLET BY MOUTH EVERY DAY, Disp: 90 tablet, Rfl: 1    Biotin 5 MG tablet, Take 3 tablets by mouth., Disp: , Rfl:     COLLAGEN PO, Take 10 mg by mouth Daily., Disp: , Rfl:     fluticasone (FLONASE) 50 MCG/ACT nasal spray, 2 sprays by Each Nare route Daily., Disp: 16 g, Rfl: 6    hydroCHLOROthiazide 12.5 MG tablet, TAKE 1 TABLET BY MOUTH EVERY DAY, Disp: 90 tablet, Rfl: 1    Klor-Con M20 20 MEQ CR tablet, TAKE 1 TABLET BY MOUTH EVERY DAY, Disp: 90 tablet, Rfl: 2    levocetirizine (XYZAL) 5 MG tablet, Take 0.5 tablets by mouth Every Evening., Disp: , Rfl:     montelukast (Singulair) 10 MG tablet, Take 1 tablet by mouth Every Night., Disp: 90 tablet, Rfl: 1    Multiple Vitamins-Calcium (ONE-A-DAY WOMENS FORMULA PO), One-A-Day Womens Formula, Disp: , Rfl:     neomycin-polymyxin-hydrocortisone (CORTISPORIN) 1 % solution otic solution, Please see attached for detailed directions, Disp: , Rfl:     pantoprazole (PROTONIX) 40 MG EC tablet, Take 1 tablet by mouth Daily., Disp: 90 tablet, Rfl: 3    psyllium (METAMUCIL SMOOTH TEXTURE) 28 % packet, Take  by mouth., Disp: , Rfl:     Allergies   Allergen Reactions    Ace Inhibitors Other (See Comments), Swelling and Unknown - High Severity     lisinopril  lisinopril      Omeprazole Other (See Comments) and Unknown - High Severity     Developed swollen lips with tiny white sores    Sulfa  "Antibiotics Swelling and Unknown - Low Severity    Levofloxacin Myalgia and Other (See Comments)     Muscle pain      Lisinopril Angioedema       Family History   Problem Relation Age of Onset    Leukemia Father     Lymphoma Father     Hodgkin's lymphoma Father     Cancer Father         Lymphoma and CLL    Hypertension Mother     Diabetes Mother         Type 2    Stroke Mother     High cholesterol Brother     Hypertension Brother     Diabetes Brother     COPD Brother         He smoked for years.    Stroke Brother         TIA    Vision loss Maternal Aunt         Glaucoma    Colon cancer Neg Hx     Colon polyps Neg Hx     Ovarian cancer Neg Hx     Breast cancer Neg Hx     Uterine cancer Neg Hx     Melanoma Neg Hx     Prostate cancer Neg Hx      Review of Systems   Constitutional:  Negative for unexpected weight change.   HENT:  Negative for trouble swallowing.    Respiratory:  Negative for shortness of breath.    Cardiovascular:  Negative for chest pain.   Musculoskeletal:  Negative for neck stiffness.   Neurological:  Negative for seizures.   Hematological:  Does not bruise/bleed easily.         Objective   /82 (BP Location: Right arm, Patient Position: Sitting, Cuff Size: Adult)   Ht 162.6 cm (64.02\")   Wt 89.9 kg (198 lb 1.6 oz)   LMP  (LMP Unknown)   Breastfeeding No   BMI 33.99 kg/m²     Physical Exam   Physical Exam  Vitals reviewed.   Constitutional:       Appearance: She is well-developed.   HENT:      Head: Normocephalic and atraumatic.   Eyes:      General: No scleral icterus.  Neck:      Trachea: No tracheal deviation.   Cardiovascular:      Rate and Rhythm: Normal rate and regular rhythm.   Pulmonary:      Effort: Pulmonary effort is normal.      Breath sounds: Normal breath sounds.   Abdominal:      General: Bowel sounds are normal. There is no distension.      Palpations: Abdomen is soft.      Tenderness: There is no abdominal tenderness.   Genitourinary:     Exam position: Supine.      Labia: "         Right: No lesion.         Left: No lesion.       Vagina: No vaginal discharge or bleeding.      Cervix: No cervical motion tenderness.      Uterus: Not enlarged, not fixed and not tender.       Adnexa:         Right: No mass.          Left: No mass.        Rectum: No external hemorrhoid.   Skin:     General: Skin is warm and dry.   Neurological:      Mental Status: She is alert and oriented to person, place, and time.         Labs  Lab Results   Component Value Date     04/29/2025    HGB 14.7 04/29/2025    HCT 44.9 04/29/2025    WBC 6.4 04/29/2025     04/29/2025    K 3.7 04/29/2025    CL 99 04/29/2025    CO2 25 04/29/2025    BUN 14 04/29/2025    CREATININE 0.85 04/29/2025    GLUCOSE 94 04/29/2025    ALBUMIN 4.6 04/29/2025    CALCIUM 10.0 04/29/2025    AST 29 04/29/2025    ALT 25 04/29/2025    BILITOT 0.5 04/29/2025        Assessment & Plan    Diagnoses and all orders for this visit:    1. Moderate dysplasia of cervix (EHSAN II) (Primary)  -     Case Request  -     CBC & Differential; Future  -     Basic Metabolic Panel; Future  -     sodium chloride 0.9 % flush 10 mL  -     sodium chloride 0.9 % flush 1-10 mL  -     sodium chloride 0.9 % infusion 40 mL  -     sodium chloride 0.9 % infusion    Other orders  -     Follow Anesthesia Guidelines / Protocol; Future  -     Follow Anesthesia Guidelines / Protocol; Standing  -     Provide NPO Instructions to Patient; Future  -     Insert Peripheral IV; Standing  -     Saline Lock & Maintain IV Access; Standing  -     VTE Prophylaxis Not Indicated:; Standing    Risks, benefits and indications for LEEP conization were discussed with the patient at length.  Risks of bleeding and infection were reviewed.  Post operative restrictions were discussed and encouraged.  The importance of post operative pap smear surveillance as directed was explained.      All of the patient's questions were answered to her satisfaction and she agreed to proceeding as  indicated.      Tavon Quinn MD  5/21/2025  09:49 CDT

## 2025-05-28 ENCOUNTER — PRE-ADMISSION TESTING (OUTPATIENT)
Dept: PREADMISSION TESTING | Facility: HOSPITAL | Age: 65
End: 2025-05-28
Payer: COMMERCIAL

## 2025-05-28 VITALS
WEIGHT: 201.72 LBS | RESPIRATION RATE: 16 BRPM | OXYGEN SATURATION: 97 % | BODY MASS INDEX: 35.74 KG/M2 | HEIGHT: 63 IN | SYSTOLIC BLOOD PRESSURE: 151 MMHG | DIASTOLIC BLOOD PRESSURE: 90 MMHG | HEART RATE: 99 BPM

## 2025-05-28 PROCEDURE — 85025 COMPLETE CBC W/AUTO DIFF WBC: CPT | Performed by: OBSTETRICS & GYNECOLOGY

## 2025-05-28 PROCEDURE — 80048 BASIC METABOLIC PNL TOTAL CA: CPT | Performed by: OBSTETRICS & GYNECOLOGY

## 2025-05-28 NOTE — DISCHARGE INSTRUCTIONS

## (undated) DEVICE — THE CHANNEL CLEANING BRUSH IS A NYLON FLEXI BRUSH ATTACHED TO A FLEXIBLE PLASTIC SHEATH DESIGNED TO SAFELY REMOVE DEBRIS FROM FLEXIBLE ENDOSCOPES.

## (undated) DEVICE — CUFF,BP,DISP,1 TUBE,ADULT,HP: Brand: MEDLINE

## (undated) DEVICE — FRCP BIOP ENDO CAPTURAPRO SPK SERR 2.8MM 230CM

## (undated) DEVICE — YANKAUER,BULB TIP WITH VENT: Brand: ARGYLE

## (undated) DEVICE — SNAR POLYP CAPTIVATOR RND STFF 2.4 240CM 10MM 1P/U

## (undated) DEVICE — Device: Brand: DEFENDO AIR/WATER/SUCTION AND BIOPSY VALVE

## (undated) DEVICE — TBG SMPL FLTR LINE NASL 02/C02 A/ BX/100

## (undated) DEVICE — CONMED SCOPE SAVER BITE BLOCK, 20X27 MM: Brand: SCOPE SAVER

## (undated) DEVICE — THE SINGLE USE ETRAP – POLYP TRAP IS USED FOR SUCTION RETRIEVAL OF ENDOSCOPICALLY REMOVED POLYPS.: Brand: ETRAP

## (undated) DEVICE — SENSR O2 OXIMAX FNGR A/ 18IN NONSTR

## (undated) DEVICE — FRCP BIOP ENDO CAPTURA/PRO SERR SPK 2.8MM 230CM

## (undated) DEVICE — MASK,OXYGEN,MED CONC,ADLT,7' TUB, UC: Brand: PENDING